# Patient Record
Sex: MALE
[De-identification: names, ages, dates, MRNs, and addresses within clinical notes are randomized per-mention and may not be internally consistent; named-entity substitution may affect disease eponyms.]

---

## 2019-11-21 ENCOUNTER — HOSPITAL ENCOUNTER (INPATIENT)
Dept: HOSPITAL 5 - ED | Age: 74
LOS: 15 days | Discharge: NURSING, REHAB OR LONG-TERM CARE | DRG: 38 | End: 2019-12-06
Attending: HOSPITALIST | Admitting: INTERNAL MEDICINE
Payer: MEDICARE

## 2019-11-21 DIAGNOSIS — I11.0: ICD-10-CM

## 2019-11-21 DIAGNOSIS — J44.9: ICD-10-CM

## 2019-11-21 DIAGNOSIS — Z88.0: ICD-10-CM

## 2019-11-21 DIAGNOSIS — Z95.1: ICD-10-CM

## 2019-11-21 DIAGNOSIS — R29.709: ICD-10-CM

## 2019-11-21 DIAGNOSIS — G47.30: ICD-10-CM

## 2019-11-21 DIAGNOSIS — Z79.899: ICD-10-CM

## 2019-11-21 DIAGNOSIS — Z72.89: ICD-10-CM

## 2019-11-21 DIAGNOSIS — F17.200: ICD-10-CM

## 2019-11-21 DIAGNOSIS — I50.22: ICD-10-CM

## 2019-11-21 DIAGNOSIS — I25.10: ICD-10-CM

## 2019-11-21 DIAGNOSIS — Z87.01: ICD-10-CM

## 2019-11-21 DIAGNOSIS — R29.810: ICD-10-CM

## 2019-11-21 DIAGNOSIS — G81.94: ICD-10-CM

## 2019-11-21 DIAGNOSIS — R47.1: ICD-10-CM

## 2019-11-21 DIAGNOSIS — R47.01: ICD-10-CM

## 2019-11-21 DIAGNOSIS — I65.22: ICD-10-CM

## 2019-11-21 DIAGNOSIS — Z82.49: ICD-10-CM

## 2019-11-21 DIAGNOSIS — R00.1: ICD-10-CM

## 2019-11-21 DIAGNOSIS — I63.9: Primary | ICD-10-CM

## 2019-11-21 DIAGNOSIS — E11.40: ICD-10-CM

## 2019-11-21 LAB
ALBUMIN SERPL-MCNC: 4.5 G/DL (ref 3.9–5)
ALT SERPL-CCNC: 5 UNITS/L (ref 7–56)
APTT BLD: 47.9 SEC. (ref 24.2–36.6)
BASOPHILS # (AUTO): 0.1 K/MM3 (ref 0–0.1)
BASOPHILS NFR BLD AUTO: 0.7 % (ref 0–1.8)
BILIRUB DIRECT SERPL-MCNC: < 0.2 MG/DL (ref 0–0.2)
BUN SERPL-MCNC: 14 MG/DL (ref 9–20)
BUN/CREAT SERPL: 18 %
CALCIUM SERPL-MCNC: 9.8 MG/DL (ref 8.4–10.2)
EOSINOPHIL # BLD AUTO: 0.2 K/MM3 (ref 0–0.4)
EOSINOPHIL NFR BLD AUTO: 3 % (ref 0–4.3)
HCT VFR BLD CALC: 50.3 % (ref 35.5–45.6)
HEMOLYSIS INDEX: 33
HGB BLD-MCNC: 16.1 GM/DL (ref 11.8–15.2)
INR PPP: 1.54 (ref 0.87–1.13)
LYMPHOCYTES # BLD AUTO: 2.1 K/MM3 (ref 1.2–5.4)
LYMPHOCYTES NFR BLD AUTO: 27.9 % (ref 13.4–35)
MCHC RBC AUTO-ENTMCNC: 32 % (ref 32–34)
MCV RBC AUTO: 98 FL (ref 84–94)
MONOCYTES # (AUTO): 0.8 K/MM3 (ref 0–0.8)
MONOCYTES % (AUTO): 10.1 % (ref 0–7.3)
PLATELET # BLD: 172 K/MM3 (ref 140–440)
RBC # BLD AUTO: 5.15 M/MM3 (ref 3.65–5.03)

## 2019-11-21 PROCEDURE — 90686 IIV4 VACC NO PRSV 0.5 ML IM: CPT

## 2019-11-21 PROCEDURE — 70496 CT ANGIOGRAPHY HEAD: CPT

## 2019-11-21 PROCEDURE — 70551 MRI BRAIN STEM W/O DYE: CPT

## 2019-11-21 PROCEDURE — 94760 N-INVAS EAR/PLS OXIMETRY 1: CPT

## 2019-11-21 PROCEDURE — 80048 BASIC METABOLIC PNL TOTAL CA: CPT

## 2019-11-21 PROCEDURE — 86901 BLOOD TYPING SEROLOGIC RH(D): CPT

## 2019-11-21 PROCEDURE — C9113 INJ PANTOPRAZOLE SODIUM, VIA: HCPCS

## 2019-11-21 PROCEDURE — 85025 COMPLETE CBC W/AUTO DIFF WBC: CPT

## 2019-11-21 PROCEDURE — 85610 PROTHROMBIN TIME: CPT

## 2019-11-21 PROCEDURE — 85347 COAGULATION TIME ACTIVATED: CPT

## 2019-11-21 PROCEDURE — C1768 GRAFT, VASCULAR: HCPCS

## 2019-11-21 PROCEDURE — A4649 SURGICAL SUPPLIES: HCPCS

## 2019-11-21 PROCEDURE — 80076 HEPATIC FUNCTION PANEL: CPT

## 2019-11-21 PROCEDURE — 3E03317 INTRODUCTION OF OTHER THROMBOLYTIC INTO PERIPHERAL VEIN, PERCUTANEOUS APPROACH: ICD-10-PCS | Performed by: INTERNAL MEDICINE

## 2019-11-21 PROCEDURE — 93306 TTE W/DOPPLER COMPLETE: CPT

## 2019-11-21 PROCEDURE — 36415 COLL VENOUS BLD VENIPUNCTURE: CPT

## 2019-11-21 PROCEDURE — 93005 ELECTROCARDIOGRAM TRACING: CPT

## 2019-11-21 PROCEDURE — 96375 TX/PRO/DX INJ NEW DRUG ADDON: CPT

## 2019-11-21 PROCEDURE — 70498 CT ANGIOGRAPHY NECK: CPT

## 2019-11-21 PROCEDURE — 84484 ASSAY OF TROPONIN QUANT: CPT

## 2019-11-21 PROCEDURE — 85018 HEMOGLOBIN: CPT

## 2019-11-21 PROCEDURE — 85730 THROMBOPLASTIN TIME PARTIAL: CPT

## 2019-11-21 PROCEDURE — 86900 BLOOD TYPING SEROLOGIC ABO: CPT

## 2019-11-21 PROCEDURE — 80053 COMPREHEN METABOLIC PANEL: CPT

## 2019-11-21 PROCEDURE — 84443 ASSAY THYROID STIM HORMONE: CPT

## 2019-11-21 PROCEDURE — 88304 TISSUE EXAM BY PATHOLOGIST: CPT

## 2019-11-21 PROCEDURE — 85670 THROMBIN TIME PLASMA: CPT

## 2019-11-21 PROCEDURE — 85014 HEMATOCRIT: CPT

## 2019-11-21 PROCEDURE — 82962 GLUCOSE BLOOD TEST: CPT

## 2019-11-21 PROCEDURE — 80061 LIPID PANEL: CPT

## 2019-11-21 PROCEDURE — 88311 DECALCIFY TISSUE: CPT

## 2019-11-21 PROCEDURE — 86850 RBC ANTIBODY SCREEN: CPT

## 2019-11-21 PROCEDURE — 70450 CT HEAD/BRAIN W/O DYE: CPT

## 2019-11-21 PROCEDURE — 93010 ELECTROCARDIOGRAM REPORT: CPT

## 2019-11-21 PROCEDURE — 36620 INSERTION CATHETER ARTERY: CPT

## 2019-11-21 PROCEDURE — 96374 THER/PROPH/DIAG INJ IV PUSH: CPT

## 2019-11-21 NOTE — CAT SCAN REPORT
CT head/brain wo con



INDICATION / CLINICAL INFORMATION:

74 years Male; MAIN: neuro deficits <6hrs or sx present upon awakening- CIODE STROKE ALERT PLEASE REILLY
L [phone]- LAST NORM @ 1700 WITH APHASIA AND RT SIDE FACIAL DROOP.



TECHNIQUE: Routine CT head without contrast. All CT scans at this location are performed using CT dos
e reduction for ALARA by means of automated exposure control.



COMPARISON: 

None.



FINDINGS:



BRAIN / INTRACRANIAL CONTENTS: Subtle loss of gray/white differentiation is suggested in the left par
ietal temporal region and perhaps along the posterior aspect of the insular cortex. Small lacunar inf
arcts suggested in the thalamic regions, as well as the head of the left caudate. Diffusion imaging b
y MRI may be helpful for further evaluation.



Small subdural hygromas suggested bilaterally.



 Otherwise, no acute hemorrhage, mass effect, midline shift, hydrocephalus, or acute, large territori
al infarct.



Moderate cerebral and cerebellar atrophy. Old, branch SCA infarct is seen on the right.



There are moderate areas of decreased attenuation in the white matter of the cerebral hemispheres. Th
jammie are nonspecific findings and may be related to microangiopathy (hypertension, diabetes, atheroscl
erosis), given the patient's age. It might be difficult to evaluate for small areas of ischemia witho
ut diffusion imaging by MRI.



CRANIOCERVICAL JUNCTION: No significant abnormality.



ORBITS: No significant abnormality of visualized orbits.

SINUSES / MASTOIDS: No significant abnormality the visualized paranasal sinuses or mastoid air cells.




ADDITIONAL FINDINGS: Atherosclerotic disease is seen in the anterior circulation. 



IMPRESSION:

1. Early areas of ischemia in the left parietal temporal region suggested, as described above. No def
initive signs of hemorrhagic transformation. Small lacunar infarcts are seen in the gangliocapsular/t
halamic regions as well. Diffusion imaging by MRI would be helpful for further evaluation.

2. Otherwise, no focal mass, hemorrhage, hydrocephalus, or large territorial infarct seen.



This exam was performed as part of a code stroke protocol. The exam was completed on 11/21/2019 524 P
M. The exam was reviewed at 5:40 PM and Dr. Gomez was notified at 5:42 PM.



Signer Name: Glenn Elliott MD, III 

Signed: 11/21/2019 6:43 PM

 Workstation Name: [id number]

## 2019-11-21 NOTE — CAT SCAN REPORT
CT angio neck



INDICATION / CLINICAL INFORMATION:

74 years Male; MAIN: Code stroke from earlier- 100ml Omni 350.



TECHNIQUE: Thin cut axial images obtained through the head during IV bolus contrast administration. S
agittal, coronal, and 3 plane MIP reconstructions performed by the technologist. NASCET type criteria
 used evaluate stenoses. All CT scans at this location are performed using CT dose reduction for ALAR
A by means of automated exposure control.



COMPARISON:

None available.



FINDINGS: 



ARCH: Normal aortic arch branching suggested. Atherosclerotic disease scattered throughout.



CAROTID ARTERIES: The right common and internal carotid arteries are widely patent. There is atherosc
lerotic disease seen at the carotid bifurcation region on the right.



There is high-grade narrowing at the origin of the left internal carotid artery in the region of the 
bulb-near occlusion suggested. Carotid Doppler analysis may be of benefit. The left common carotid is
 widely patent.



VERTEBRAL ARTERIES: Codominant vertebral system seen. There is significant narrowing at the origin of
 the vertebral arteries, related to atherosclerotic disease.



ADDITIONAL FINDINGS: 2, and possibly 3, lesions are seen in the right parotid gland-pleomorphic adeno
mas or Warthin's tumors might be considerations.



Old inferior orbital wall fracture seen on the left. 



IMPRESSION: 

1. High-grade narrowing seen at the origin of the left internal carotid artery. Correlation with romeo
tid Doppler analysis may be of benefit.

2. Hemodynamically significant narrowing seen at the origin of the vertebral arteries.

3. Parotid lesions as described above.

4. Patient reportedly has prior CTs under a different name- Elpidio Hood - from 5/10/2019. However, I a
m unable to review these exams at this time. Once these exams are made available, an addendum can be 
performed for comparative purposes, if needed.



Signer Name: Glenn Elliott MD, III 

Signed: 11/21/2019 10:15 PM

 Workstation Name: [id number]

## 2019-11-21 NOTE — EMERGENCY DEPARTMENT REPORT
HPI





- General


Chief Complaint: Neuro Symptoms/Deficit


Time Seen by Provider: 11/21/19 18:11





- HPI


HPI: 


74-year-old  male presents to the emergency department via EMS from 

home as a code stroke.  The patient has a history of a left-sided MCA stroke 

back in May that left him with some right-sided residual deficits such as 

weakness.  Apparently the patient was last at his baseline, or his last known 

well time, was around 4:30 PM this afternoon when he was seen by his daughter, 

who we have heard from over the phone.  She went out to the store and when she 

came back 20 minutes later he was having difficulty speaking and some increased 

right-sided weakness.  Patient is currently completely aphasic and response to 

questions asked by nodding and shaking his head but does appear to understand 

the questions.  He did not receive anything for his symptoms in route with EMS. 

He is not on any anticoagulation.  No history of GI bleeds.





ED Past Medical Hx





- Past Medical History


Hx Congestive Heart Failure: Yes


Hx Diabetes: Yes


Additional medical history: neuropathy





- Surgical History


Additional Surgical History: CABG x4





- Social History


Smoking Status: Current Every Day Smoker





ED Review of Systems


ROS: 


Stated complaint: POSS CVA


Other details as noted in HPI





Comment: All other systems reviewed and negative


Constitutional: weakness.  denies: fever


Eyes: denies: eye pain, vision change


ENT: denies: ear pain, throat pain


Respiratory: denies: cough, shortness of breath


Cardiovascular: denies: chest pain, palpitations


Gastrointestinal: denies: abdominal pain, vomiting


Genitourinary: denies: dysuria, discharge


Musculoskeletal: denies: back pain, arthralgia


Skin: denies: rash, lesions


Neurological: weakness, other (aphasia)





Physical Exam





- Physical Exam


Vital Signs: 


                                   Vital Signs











  11/21/19 11/21/19 11/21/19





  18:40 18:52 18:59


 


Temperature 98.3 F  


 


Pulse Rate 78 74 69


 


Respiratory 18  





Rate   


 


Blood Pressure 169/80  159/61


 


Blood Pressure  168/88 





[Left]   


 


O2 Sat by Pulse 99  





Oximetry   














  11/21/19





  19:05


 


Temperature 


 


Pulse Rate 71


 


Respiratory 





Rate 


 


Blood Pressure 168/88


 


Blood Pressure 





[Left] 


 


O2 Sat by Pulse 





Oximetry 











Physical Exam: 





GENERAL: The patient is well-developed well-nourished.


HENT: Normocephalic.  Atraumatic.    Patient has moist mucous membranes.


EYES: Extraocular motions are intact.  Pupils equal reactive to light 

bilaterally.


NECK: Supple. Trachea is midline.


CHEST/LUNGS: Clear to auscultation.  There is no respiratory distress noted.


HEART/CARDIOVASCULAR: Regular.  There is no tachycardia.  There is no murmur.


ABDOMEN: Abdomen is soft, nontender.  Patient has normal bowel sounds.  There is

 no abdominal distention.


SKIN: Skin is warm and dry.


NEURO: The patient is awake, alert, and oriented.  The patient is cooperative.  

Right-sided nasolabial fold paresis.  Aphasia and dysarthria.  There is a right 

upper extremity drift but it does not hit the bed.


MUSCULOSKELETAL: There is no tenderness or deformity.   There is no evidence of 

acute injury.





ED Course


                                   Vital Signs











  11/21/19 11/21/19 11/21/19





  18:40 18:52 18:59


 


Temperature 98.3 F  


 


Pulse Rate 78 74 69


 


Respiratory 18  





Rate   


 


Blood Pressure 169/80  159/61


 


Blood Pressure  168/88 





[Left]   


 


O2 Sat by Pulse 99  





Oximetry   














  11/21/19





  19:05


 


Temperature 


 


Pulse Rate 71


 


Respiratory 





Rate 


 


Blood Pressure 168/88


 


Blood Pressure 





[Left] 


 


O2 Sat by Pulse 





Oximetry 














ED Medical Decision Making





- Lab Data


Result diagrams: 


                                 11/21/19 19:40





                                 11/21/19 19:40





- EKG Data


-: EKG Interpreted by Me


EKG shows normal: sinus rhythm (ventricular trigeminy), axis (left axis 

deviation), intervals, QRS complexes (left bundle-branch block), ST-T waves


Rate: normal





- EKG Data


When compared to previous EKG there are: previous EKG unavailable


Interpretation: other (sinus rhythm with ventricular trigeminy, left axis 

deviation, left bundle branch block)





- Radiology Data


Radiology results: report reviewed





CT head/brain wo con INDICATION / CLINICAL INFORMATION: 74 years Male; MAIN: 

neuro deficits <6hrs or sx present upon awakening- CIODE STROKE ALERT PLEASE 

CALL [phone]- LAST NORM @ 1700 WITH APHASIA AND RT SIDE FACIAL DROOP. 

TECHNIQUE: Routine CT head without contrast. All CT scans at this location are 

performed using CT dose reduction for ALARA by means of automated exposure 

control. COMPARISON: None. FINDINGS: BRAIN / INTRACRANIAL CONTENTS: Subtle loss 

of gray/white differentiation is suggested in the left parietal temporal region 

and perhaps along the posterior aspect of the insular cortex. Small lacunar inf

arcts suggested in the thalamic regions, as well as the head of the left 

caudate. Diffusion imaging by MRI may be helpful for further evaluation. Small 

subdural hygromas suggested bilaterally. Otherwise, no acute hemorrhage, mass 

effect, midline shift, hydrocephalus, or acute, large territorial infarct. 

Moderate cerebral and cerebellar atrophy. Old, branch SCA infarct is seen on the

 right. There are moderate areas of decreased attenuation in the white matter of

 the cerebral hemispheres. These are nonspecific findings and may be related to 

microangiopathy (hypertension, diabetes, atherosclerosis), given the patient's 

age. It might be difficult to evaluate for small areas of ischemia without d

iffusion imaging by MRI. CRANIOCERVICAL JUNCTION: No significant abnormality. 

ORBITS: No significant abnormality of visualized orbits. SINUSES / MASTOIDS: No 

significant abnormality the visualized paranasal sinuses or mastoid air cells. 

ADDITIONAL FINDINGS: Atherosclerotic disease is seen in the anterior 

circulation. IMPRESSION: 1. Early areas of ischemia in the left parietal 

temporal region suggested, as described above. No definitive signs of 

hemorrhagic transformation. Small lacunar infarcts are seen in the ga 

ngliocapsular/thalamic regions as well. Diffusion imaging by MRI would be 

helpful for further evaluation. 2. Otherwise, no focal mass, hemorrhage, 

hydrocephalus, or large territorial infarct seen. 








CT angio head INDICATION / CLINICAL INFORMATION: 74 years Male; Stroke. 

TECHNIQUE: Thin cut axial images obtained through the head during IV bolus co

ntrast administration. Sagittal, coronal, and 3 plane MIP reconstructions 

performed by the technologist. NASCET type criteria used evaluate stenoses. 

Automated exposure control utilized for radiation reduction purposes. Motion 

artifact COMPARISON: None available. FINDINGS: INTERNAL CAROTID ARTERIES: 

Atherosclerotic disease is seen in the cavernous portion of both internal 

carotid arteries. Some of these areas of narrowing may be hemodynamically 

significant. VERTEBROBASILAR SYSTEM: No significant narrowing appreciated. 

DISTAL BRANCHES: Distal branches of the anterior, middle, and posterior cerebral

 arteries are fairly symmetric in appearance and number. Areas of mild narrowing

 cannot entirely be excluded. Certainly, no branch occlusion is appreciated. 

Duplicated M1 segment suggested on the left. ANEURYSM: None identified. 

ADDITIONAL FINDINGS: Developmental venous anomaly suggested in the inferior 

frontal gyral region on the left-of no clinical significance. IMPRESSION: Areas 

of narrowing, as described above. 








CT angio neck INDICATION / CLINICAL INFORMATION: 74 years Male; MAIN: Code 

stroke from earlier- 100ml Omni 350. TECHNIQUE: Thin cut axial images obtained 

through the head during IV bolus contrast administration. Sagittal, coronal, and

 3 plane MIP reconstructions performed by the technologist. NASCET type criteria

 used evaluate stenoses. All CT scans at this location are performed using CT 

dose reduction for ALARA by means of automated exposure control. COMPARISON: 

None available. FINDINGS: ARCH: Normal aortic arch branching suggested. A

therosclerotic disease scattered throughout. CAROTID ARTERIES: The right common 

and internal carotid arteries are widely patent. There is atherosclerotic 

disease seen at the carotid bifurcation region on the right. There is high-grade

 narrowing at the origin of the left internal carotid artery in the region of 

the bulb-near occlusion suggested. Carotid Doppler analysis may be of benefit. 

The left common carotid is widely patent. VERTEBRAL ARTERIES: Codominant 

vertebral system seen. There is significant narrowing at the origin of the 

vertebral arteries, related to atherosclerotic disease. ADDITIONAL FINDINGS: 2, 

and possibly 3, lesions are seen in the right parotid gland-pleomorphic adenomas

 or Warthin's tumors might be considerations. Old inferior orbital wall fracture

 seen on the left. IMPRESSION: 1. High-grade narrowing seen at the origin of the

 left internal carotid artery. Correlation with carotid Doppler analysis may be 

of benefit. 2. Hemodynamically significant narrowing seen at the origin of the 

vertebral arteries. 3. Parotid lesions as described above. 4. Patient reportedly

 has prior CTs under a different name- Elpidio Hood - from 5/10/2019. However, I 

am unable to review these exams at this time. Once these exams are made 

available, an addendum can be performed for comparative purposes, if needed. 





- Medical Decision Making


This patient presents with increased right-sided weakness, dysarthria, aphasia 

and worsened right-sided facial droop.  The patient was in the TPA window.  He 

was seen by neurology both before and after he had the CT scan of the head 

completed.  CT head did not show any bleed, shift, mass, ischemia or any other 

acute process.  Patient was found to have a NIH stroke scale of 9.  There was a 

long discussion regarding the risks versus benefits of TPA and there was a 

unanimous choice of administering TPA.  After TPA was given the patient had CT 

angiography of the head and neck completed.  There was no obvious large vessel 

occlusion but the patient does appear to have significant left carotid stenosis.

  His labs have been mostly unremarkable.  Patient will be admitted to the 

hospital for further evaluation, probable neurology consult, possible vascular 

consult and further treatment.  He was accepted for admission by the 

hospitalist, Dr. Chu.








- Differential Diagnosis


CVA, TIA, Hypoglycemia, Dysrythmia


Critical Care Time: Yes


Critical care time in (mins) excluding proc time.: 35


Critical care attestation.: 


If time is entered above; I have spent that time in minutes in the direct care 

of this critically ill patient, excluding procedure time.  Critical care time 

was spent on this patient and doing his initial evaluation, multiple re-

evaluations, discussion with the neurologist, discussion with the family, 

ordering and interpretation of labs and imaging, a long discussion regarding TPA





Critical Care Time: 





35 minutes





ED Disposition


Clinical Impression: 


Stroke


Qualifiers:


 CVA mechanism: unspecified Qualified Code(s): I63.9 - Cerebral infarction, 

unspecified





Carotid stenosis


Qualifiers:


 Laterality: left Qualified Code(s): I65.22 - Occlusion and stenosis of left 

carotid artery





Hypertension


Qualifiers:


 Hypertension type: essential hypertension Qualified Code(s): I10 - Essential 

(primary) hypertension





Disposition: DC-09 OP ADMIT IP TO THIS HOSP


Is pt being admited?: Yes


Condition: Serious


Time of Disposition: 23:36

## 2019-11-21 NOTE — CAT SCAN REPORT
CT angio head



INDICATION / CLINICAL INFORMATION:

74 years Male; Stroke.



TECHNIQUE: Thin cut axial images obtained through the head during IV bolus contrast administration. S
agittal, coronal, and 3 plane MIP reconstructions performed by the technologist. NASCET type criteria
 used evaluate stenoses. Automated exposure control utilized for radiation reduction purposes. Motion
 artifact



COMPARISON: 

None available.



FINDINGS:



INTERNAL CAROTID ARTERIES: Atherosclerotic disease is seen in the cavernous portion of both internal 
carotid arteries. Some of these areas of narrowing may be hemodynamically significant.



VERTEBROBASILAR SYSTEM: No significant narrowing appreciated.



DISTAL BRANCHES: Distal branches of the anterior, middle, and posterior cerebral arteries are fairly 
symmetric in appearance and number. Areas of mild narrowing cannot entirely be excluded. Certainly, n
o branch occlusion is appreciated. Duplicated M1 segment suggested on the left.



ANEURYSM: None identified.



ADDITIONAL FINDINGS: Developmental venous anomaly suggested in the inferior frontal gyral region on t
he left-of no clinical significance. 



IMPRESSION:

Areas of narrowing, as described above.



Signer Name: Glenn Elliott MD, III 

Signed: 11/21/2019 9:23 PM

 Workstation Name: [id number]

## 2019-11-21 NOTE — EMERGENCY DEPARTMENT REPORT
ED Neuro Deficit HPI





- General


Chief Complaint: Neuro Symptoms/Deficit


Stated Complaint: POSS CVA


Time Seen by Provider: 11/21/19 18:11


Source: EMS


Mode of arrival: Stretcher


Limitations: Altered Mental Status





- History of Present Illness


Initial Comments: 





[facility]


[facility]








Date of Service:   11/21/2019 17:50:39





Impression:


      RO Acute Ischemic Stroke





Comments:


74 year old male with left MCA distribution stroke. Stroke is likely secondary 

to his history of left ICA stenosis.





Mechanism of Stroke:


Possible Thromboembolic


Possible Cardioembolic





Metrics:


Last Known Well: 11/21/2019 16:30:00


[facility] Notification Time: 11/21/2019 17:49:38


Arrival Time: 11/21/2019 18:03:00


Stamp Time: 11/21/2019 17:50:39


Time First Login Attempt: 11/21/2019 17:55:00


Video Start Time: 11/21/2019 17:55:00





Symptoms: Aphasia


NIHSS Start Assessment Time: 11/21/2019 18:15:00


tPA Verbal Order Time: 11/21/2019 18:43:44


Patient is a candidate for tPA.


tPA CPOE Order Time: 11/21/2019 18:51:43


Needle Time: 11/21/2019 19:00:02


Weight Noted by Staff: 98.4 kg


Video End Time: 11/21/2019 19:07:53





CT head was reviewed.





Advanced imaging is to be reviewed by ED physician and RAD[facility].


Advanced imaging CTA head and neck obtained.








Radiologist was not called back for review of advanced imaging because CTA 

pending


ER Physician notified of the decision on thrombolytics management on 11/21/2019 

19:00:17





Verbal Consent to tPA:


I have explained to the Patient and Family the nature of the patients 

condition, the use of tPA fibrinolytic agent, and the benefits to be reasonably 

expected compared with alternative approaches. I have discussed the likelihood 

of major risks or complications of this procedure including (if applicable) but 

not limited to loss of limb function, brain damage, paralysis, hemorrhage, 

infection, complications from transfusion of blood components, drug reactions, 

blood clots and loss of life. I have also indicated that with any procedure 

there is always the possibility of an unexpected complication.


All questions were answered and Patient and Family express understanding of the 

treatment plan and consent to the treatment.





Our recommendations are outlined below.





Recommendations:


IV tPA recommended.





tPA bolus given Without Complication.





IV tPA Total Dose  88.6 mg


IV tPA Bolus Dose  8.9 mg


IV tPA Infusion Dose - 79.7 mg








Routine post tPA monitoring including neuro checks and blood pressure control 

during/after treatment Monitor blood pressure Check blood pressure and NIHSS 

every 15 min for 2 h, then every 30 min for 6 h, and finally every hour for 16 

h.





Manage Blood Pressure per post tPA protocol.





      Admission to ICU


      CT brain 24 hours post tPA


      NPO until swallowing screen performed and passed


      No antiplatelet agents or anticoagulants (including heparin for DVT 

prophylaxis) in first 24 hours


      No Black catheter, nasogastric tube, arterial catheter or central venous 

catheter for 24 hr, unless absolutely necessary


      Telemetry


      Bedside swallow evaluation


      HOB less than 30 degrees


      Euglycemia


      Avoid hyperthermia, PRN acetaminophen


      DVT prophylaxis


      Inpatient Neurology Consultation


      Stroke evaluation as per inpatient neurology recommendations





Additional Recommendations:


      MRI Head Without Contrast


      CTA Head and Neck


      Start Atorvastatin


      Lipid Panel


      Check Hgb A1c


      Dysphagia Screen


      DVT Prophylaxis


      Hyperglycemia Treatment as per Primary Team


      PT/ OT / Speech Therapy Consultation


      Neurology to Be Consulted for Inpatient Routine Consultation


      Echocardiogram, TTE





Discussed with ED physician











------------------------------------------------------------------------------





History of Present Illness:


Patient is a 74 years old Male.





Patient was brought by EMS for symptoms of Aphasia





74 year old male with a history of left MCA stroke in May with residual right 

side weakness who presents to the ED with acute onset of difficulty speaking. 

Patient was last seen normal at 16:30 by his daughter before she left for [employer]. She then returned home 20 minutes later and found that the patient had 

worsening of his right side weakness and was unable to speak. On exam patient 

had drift in the right arm and was mute. He was able to nod yes/no appropriately

but could not say anything.





CT head was reviewed.





Last seen normal was within 4.5 hours.


There is no history of hemorrhagic complications or intracranial hemorrhage.


There is no history of Recent Anticoagulants.


There is no history of recent major surgery.


There is no history of recent stroke.





Examination:


BP(168/88),


1A: Level of Consciousness - Alert; keenly responsive + 0


1B: Ask Month and Age - Aphasic + 2


1C: Blink Eyes & Squeeze Hands - Performs Both Tasks + 0


2: Test Horizontal Extraocular Movements - Normal + 0


3: Test Visual Fields - No Visual Loss + 0


4: Test Facial Palsy (Use Grimace if Obtunded) - Partial paralysis (lower face) 

+ 2


5A: Test Left Arm Motor Drift - No Drift for 10 Seconds + 0


5B: Test Right Arm Motor Drift - Drift, but doesn't hit bed + 1


6A: Test Left Leg Motor Drift - No Drift for 5 Seconds + 0


6B: Test Right Leg Motor Drift - No Drift for 5 Seconds + 0


7: Test Limb Ataxia (FNF/Heel-Shin) - No Ataxia + 0


8: Test Sensation - Normal; No sensory loss + 0


9: Test Language/Aphasia - Severe Aphasia: Fragmentary Expression, Inference 

Needed, Cannot Identify Materials + 2


10: Test Dysarthria - Mute/Anarthric + 2


11: Test Extinction/Inattention - No abnormality + 0





NIHSS Score: 9





Patient was informed the Neurology Consult would happen via TeleHealth consult 

by way of interactive audio and video telecommunications and consented to 

receiving care in this manner.





Due to the immediate potential for life-threatening deterioration due to 

underlying acute neurologic illness, I spent 35 minutes providing critical care.

This time includes time for face to face visit via telemedicine, review of 

medical records, imaging studies and discussion of findings with providers, the 

patient and/or family.








Dr Megha Rodriguez








[facility]


[phone]





- Related Data


Allergies/Adverse Reactions: 


                                    Allergies











Allergy/AdvReac Type Severity Reaction Status Date / Time


 


Unable to Assess Allergy   Unverified 11/21/19 18:06














ED Review of Systems


ROS: 


Stated complaint: POSS CVA


Other details as noted in HPI








ED Past Medical Hx





- Past Medical History


Hx Congestive Heart Failure: Yes


Hx Diabetes: Yes


Additional medical history: neuropathy





- Surgical History


Additional Surgical History: CABG x4





- Social History


Smoking Status: Current Every Day Smoker





ED Neuro Physical Exam





- General


Limitations: Altered Mental Status


General appearance: alert


Suspected Stroke: Yes





- NIHSS


Assessment Interval: Baseline


1a. Level of Consciousness: alert/keenly responsive


1b. LOC Questions: aphasic


1c. LOC Commands: performs tasks correctly


2. Best Gaze: normal


3. Visual: no visual loss


4. Facial Palsy: partial paralysis


5b. Motor Arm Right: drift


5a. Motor Arm Left: no drift


6a. Motor Leg Left: no drift


6b. Motor Leg Right: no drift


7. Limb Ataxia: absent


8. Sensory: normal


9. Best Language: severe aphasia


10. Dysarthria: severe dysarthria


11. Extinction/Inattention: no abnormality


Total Score: 9


Stroke Severity: Moderate Stroke





ED Course


                                   Vital Signs











  11/21/19 11/21/19 11/21/19





  18:40 18:52 18:59


 


Temperature 98.3 F  


 


Pulse Rate 78 74 69


 


Respiratory 18  





Rate   


 


Blood Pressure 169/80  159/61


 


Blood Pressure  168/88 





[Left]   


 


O2 Sat by Pulse 99  





Oximetry   














  11/21/19





  19:05


 


Temperature 


 


Pulse Rate 71


 


Respiratory 





Rate 


 


Blood Pressure 168/88


 


Blood Pressure 





[Left] 


 


O2 Sat by Pulse 





Oximetry 











Critical care attestation.: 


If time is entered above; I have spent that time in minutes in the direct care 

of this critically ill patient, excluding procedure time.








ED Disposition


Clinical Impression: 


 Stroke





Disposition: [id number] OP ADMIT IP TO THIS HOSP


Is pt being admited?: Yes


Does the pt Need Aspirin: No


Condition: Stable

## 2019-11-21 NOTE — HISTORY AND PHYSICAL REPORT
History of Present Illness


Date of examination: 11/21/19


History of present illness: 


74 -year-old  a history of CHF, coronary artery disease, diabetes, 

carotid stenosis, COPD, CVA was brought to the emergency room for evaluation.  

History is per to daughter stated that she wanted to store and while she was at 

the store the mom called her to see that the father was not able to speak that 

the left side of his face was droopy.  She went back home, called EMS and 

brought the patient to the emergency room.  TPA was given in the emergency room,

he is now able to say a few words.  Patient has a history of carotid stenosis, 

refused surgery in the past


Review of systems


Constitutional: no  weight loss, chills, fever


Ears, eyes, nose, mouth and throat: no nasal congestion, no nasal discharge, no 

sinus pressure, no vision change, no red eye.


Neck: No neck pain or rigidity.


Cardiovascular: no chest pain, palpitations


Respiratory: no cough, shortness of breath


Gastrointestinal: no abdominal pain hematochezia


Genitourinary : no  frequency , no hematuria


Musculoskeletal: no joint swelling or muscle ache 


Integumentary: no rash, no pruritis


Neurological: no parathesias, no numbness, no focal weakness


Endocrine: no cold or heat intolerance, no polyuria or polydipsia


Hematologic/Lymphatic: no easy bruising, no easy bleeding, no gland swelling


Allergic/Immunologic: no urticaria, no angioedema.





PAST MEDICAL HISTORY: CHF, coronary artery disease, carotid stenosis, diabetes, 

COPD, CVA





PAST SURGICAL HISTORY: CABG





SOCIAL HISTORY: Social alcohol, no drugs, smoke 1 pack a day





FAMILY HISTORY: Hypertension














Medications and Allergies


                                    Allergies











Allergy/AdvReac Type Severity Reaction Status Date / Time


 


Unable to Assess Allergy   Unverified 11/21/19 18:06














Exam





- Physical Exam


Narrative exam: 


Gen. appearance: Patient lying in bed, no apparent distress


HEENT: Normocephalic, atraumatic, pupils equally round and reactive to light,  

extraocular movement intact, and no sclericterus,. No JVD or thyromegaly or 

nodule,neck supple, no carotid bruit ,mucous membranes moist, no exudate or 

erythema


Heart: S1, S2, regular rate and rhythm


Lungs: Clear bilaterally, breathing comfortable


Abdomen: Positive bowel sounds, non-tender, nondistended, no organomegaly


Extremity:no edema cyanosis, clubbing


Skin:  no rash, dry, warm


Neuro: Oriented 3, cranial nerves II-12 intact, speak very few words, dysarthri

a, motor and sensory intact








- Constitutional


Vitals: 


                                        











Temp Pulse Resp BP Pulse Ox


 


 98.3 F   65   11 L  153/77   97 


 


 11/21/19 18:40  11/21/19 20:31  11/21/19 20:31  11/21/19 20:31  11/21/19 20:31














Results





- Labs


CBC & Chem 7: 


                                 11/21/19 19:40





                                 11/21/19 19:40


Labs: 


                              Abnormal lab results











  11/21/19 11/21/19 11/21/19 Range/Units





  19:40 19:40 19:40 


 


RBC  5.15 H    (3.65-5.03)  M/mm3


 


Hgb  16.1 H    (11.8-15.2)  gm/dl


 


Hct  50.3 H    (35.5-45.6)  %


 


MCV  98 H    (84-94)  fl


 


Mono % (Auto)  10.1 H    (0.0-7.3)  %


 


PT   18.3 H   (12.2-14.9)  Sec.


 


INR   1.54 H   (0.87-1.13)  


 


APTT   47.9 H   (24.2-36.6)  Sec.


 


Thrombin Time     (15.1-19.6)  Sec.


 


Sodium    136 L  (137-145)  mmol/L


 


Carbon Dioxide    19 L  (22-30)  mmol/L


 


Glucose    134 H  ()  mg/dL


 


ALT     (7-56)  units/L














  11/21/19 11/21/19 Range/Units





  19:40 19:40 


 


RBC    (3.65-5.03)  M/mm3


 


Hgb    (11.8-15.2)  gm/dl


 


Hct    (35.5-45.6)  %


 


MCV    (84-94)  fl


 


Mono % (Auto)    (0.0-7.3)  %


 


PT    (12.2-14.9)  Sec.


 


INR    (0.87-1.13)  


 


APTT    (24.2-36.6)  Sec.


 


Thrombin Time  43.9 H   (15.1-19.6)  Sec.


 


Sodium    (137-145)  mmol/L


 


Carbon Dioxide    (22-30)  mmol/L


 


Glucose    ()  mg/dL


 


ALT   5 L  (7-56)  units/L














- Imaging and Cardiology


CT Scan - head: report reviewed





Assessment and Plan


CTA head and neck reviewed





Assessment


Acute CVA, status post TPA


Carotid stenosis


CHF, stable


coronary artery disease


diabetes


COPD





Plan


Admit medicine


Obtain MRI of the head, echo


consult neurology, PT and OT, critical care


No antiplatelet agents, status post TPA, add statin


check fingersticks, initiate insulin sliding scale


DVT prophalaxis





Addendum


Patient with hematemesis, 2 episodes


Start Protonix drip , check serial hemoglobin


Consult GI

## 2019-11-22 LAB
HCT VFR BLD CALC: 41.8 % (ref 35.5–45.6)
HCT VFR BLD CALC: 43.2 % (ref 35.5–45.6)
HCT VFR BLD CALC: 43.2 % (ref 35.5–45.6)
HCT VFR BLD CALC: 43.6 % (ref 35.5–45.6)
HDLC SERPL-MCNC: 34 MG/DL (ref 40–59)
HGB BLD-MCNC: 14 GM/DL (ref 11.8–15.2)
HGB BLD-MCNC: 14.4 GM/DL (ref 11.8–15.2)
HGB BLD-MCNC: 14.8 GM/DL (ref 11.8–15.2)
HGB BLD-MCNC: 14.8 GM/DL (ref 11.8–15.2)

## 2019-11-22 NOTE — CONSULTATION
History of Present Illness





- Reason for Consult


Consult date: 11/22/19


acute CVA and carotid stenosis 





- History of Present Illness





HPI: 73yo male with multiple medical problems with hx of prior CVA in the left 

MCA distribution with associated right-sided weakness presents with episode of 

difficulty speaking and left-sided facial droop last night.  The patient was 

given TPA and Neck CTA demonstrated severe stenosis of the left carotid.  The 

patient currently only able to speak with 1-2 word answers.  The patient denies 

extremity weakness.  The patient has smoked 1 ppd for several years.  Per notes 

patient previously offered carotid endarterectomy, but refused intervention.  

Patient also with 2 episodes of emesis in the ED and started on protonix drip.





ROS: as per HPI but limited 





PMH: CVA, CHF, Diabetes





PE:


NAD, A&Ox3


non-labored respirations


RRR


abd soft, nt, nd, no palpable masses


CNs II-XII intact


right upper extremity strength 4/5, right lower extremity 5/5, left-sided ext

remity motor intact


sensory intact


feet warm





Neck CTA reviewed





Plan: Patient with symptomatic severe left-sided carotid stenosis 


patient to benefit from left CEA for stroke risk reduction, explained in detail 

to the patient and wants to proceed 


will plan for left CEA Monday


continue medical management with ASA, statin and may consider plavix given 

repeat stroke but will defer to Neurology 


regarding dark brown emesis, patient may benefit from GI consult 








Medications and Allergies


                                    Allergies











Allergy/AdvReac Type Severity Reaction Status Date / Time


 


Unable to Assess Allergy   Unverified 11/21/19 18:06











Active Meds: 


Active Medications





Acetaminophen (Tylenol)  650 mg PO Q4H PRN


   PRN Reason: Pain, Mild (1-3)


Atorvastatin Calcium (Lipitor)  80 mg PO QHS OCTAVIANO


Bisacodyl (Dulcolax)  10 mg VA QDAY PRN


   PRN Reason: Constipation


Hydralazine HCl (Apresoline)  5 mg IV Q6H PRN


   PRN Reason: Hypertension


Pantoprazole Sodium 80 mg/ (Sodium Chloride)  100 mls @ 10 mls/hr IV AS DIRECT 

OCTAVIANO


   Last Admin: 11/22/19 02:33 Dose:  8 mg/hr, 10 mls/hr


   Documented by: 


Magnesium Hydroxide (Milk Of Magnesia)  30 ml PO Q4H PRN


   PRN Reason: Constipation


Ondansetron HCl (Zofran)  4 mg IV Q8H PRN


   PRN Reason: Nausea And Vomiting


   Last Admin: 11/22/19 01:25 Dose:  4 mg


   Documented by: 


Sodium Chloride (Sodium Chloride Flush Syringe 10 Ml)  10 ml IV PRN PRN


   PRN Reason: LINE FLUSH











Exam





- Constitutional


Vitals: 


                                        











Temp Pulse Resp BP Pulse Ox


 


 98.2 F   91 H  15   129/57   93 


 


 11/22/19 00:30  11/22/19 10:00  11/22/19 10:00  11/22/19 10:00  11/22/19 10:00














Results





- Labs


CBC & Chem 7: 


                                 11/22/19 05:59





                                 11/21/19 19:40


Labs: 


                              Abnormal lab results











  11/21/19 11/21/19 11/21/19 Range/Units





  19:40 19:40 19:40 


 


RBC  5.15 H    (3.65-5.03)  M/mm3


 


Hgb  16.1 H    (11.8-15.2)  gm/dl


 


Hct  50.3 H    (35.5-45.6)  %


 


MCV  98 H    (84-94)  fl


 


Mono % (Auto)  10.1 H    (0.0-7.3)  %


 


PT   18.3 H   (12.2-14.9)  Sec.


 


INR   1.54 H   (0.87-1.13)  


 


APTT   47.9 H   (24.2-36.6)  Sec.


 


Thrombin Time     (15.1-19.6)  Sec.


 


Sodium    136 L  (137-145)  mmol/L


 


Carbon Dioxide    19 L  (22-30)  mmol/L


 


Glucose    134 H  ()  mg/dL


 


ALT     (7-56)  units/L


 


HDL Cholesterol     (40-59)  mg/dL














  11/21/19 11/21/19 11/22/19 Range/Units





  19:40 19:40 03:59 


 


RBC     (3.65-5.03)  M/mm3


 


Hgb     (11.8-15.2)  gm/dl


 


Hct     (35.5-45.6)  %


 


MCV     (84-94)  fl


 


Mono % (Auto)     (0.0-7.3)  %


 


PT     (12.2-14.9)  Sec.


 


INR     (0.87-1.13)  


 


APTT     (24.2-36.6)  Sec.


 


Thrombin Time  43.9 H    (15.1-19.6)  Sec.


 


Sodium     (137-145)  mmol/L


 


Carbon Dioxide     (22-30)  mmol/L


 


Glucose     ()  mg/dL


 


ALT   5 L   (7-56)  units/L


 


HDL Cholesterol    34 L  (40-59)  mg/dL

## 2019-11-22 NOTE — PROGRESS NOTE
Assessment and Plan


Assessment and plan: 


Patient is a 75 yo  man with a history of CHF, coronary artery disease,

diabetes, carotid stenosis, COPD and prior CVA  who presented with aphasia and 

facial droop. He received tpa and now able to mumble a couple of words. Patient 

has a history of carotid stenosis, refused surgery in the past per chart. 





* CTA head and neck reviewed





Assessment


Acute CVA, status post TPA


Carotid stenosis: Vascular surgeon consulted, input noted


CHF, stable


coronary artery disease


diabetes


COPD


Patient with emesis, 2 episodes, ?hematemesis, GI evaluated, input noted





Plan


Admit medicine


Obtain MRI of the head, echo


consult neurology, PT and OT, critical care


No antiplatelet agents, status post TPA, add statin


check fingersticks, initiate insulin sliding scale


DVT prophalaxis








History


Interval history: 


Patient was seen and examined. Follow-up on current diagnosis of CVA s/p tpa. No

overnight events reported to me.  Imaging, nursing note, chart, labs and old ch

art reviewed. 





Hospitalist Physical





- Physical exam


Narrative exam: 


Gen: chronically disable appearing, NAD, Awake, Alert, Orientated 


HEENT: NCAT, EOMI, PERRL, OP Clear 


Neck: supple, no adenopathy, no thyromegaly, no JVD 


CVS/Heart: RRR, normal S1S2, pulses present bilaterally 


Chest/Lungs: CTA B, Symmetrical chest expansion, good air entry bilaterally 


GI/Abdomen: soft, NTND, good bowel sounds, no guarding or rebound 


/Bladder: no suprapubic tenderness, no CVA or paraspinal tenderness 


Extermity/Skin: no c/c/e, no obvious rash 


MSK: FROM x 4 


Neuro: CN 2-12 grossly intact, expressive aphasia


Psych: calm 





- Constitutional


Vitals: 


                                        











Temp Pulse Resp BP Pulse Ox


 


 98.2 F   77   18   151/58   99 


 


 11/22/19 00:30  11/22/19 17:00  11/22/19 17:00  11/22/19 17:00  11/22/19 17:00














Results





- Labs


CBC & Chem 7: 


                                 11/22/19 13:13





                                 11/21/19 19:40


Labs: 


                             Laboratory Last Values











WBC  7.5 K/mm3 (4.5-11.0)   11/21/19  19:40    


 


RBC  5.15 M/mm3 (3.65-5.03)  H  11/21/19  19:40    


 


Hgb  14.0 gm/dl (11.8-15.2)   11/22/19  13:13    


 


Hct  41.8 % (35.5-45.6)   11/22/19  13:13    


 


MCV  98 fl (84-94)  H  11/21/19  19:40    


 


MCH  31 pg (28-32)   11/21/19  19:40    


 


MCHC  32 % (32-34)   11/21/19  19:40    


 


RDW  15.0 % (13.2-15.2)   11/21/19  19:40    


 


Plt Count  172 K/mm3 (140-440)   11/21/19  19:40    


 


Lymph % (Auto)  27.9 % (13.4-35.0)   11/21/19  19:40    


 


Mono % (Auto)  10.1 % (0.0-7.3)  H  11/21/19  19:40    


 


Eos % (Auto)  3.0 % (0.0-4.3)   11/21/19  19:40    


 


Baso % (Auto)  0.7 % (0.0-1.8)   11/21/19  19:40    


 


Lymph #  2.1 K/mm3 (1.2-5.4)   11/21/19  19:40    


 


Mono #  0.8 K/mm3 (0.0-0.8)   11/21/19  19:40    


 


Eos #  0.2 K/mm3 (0.0-0.4)   11/21/19  19:40    


 


Baso #  0.1 K/mm3 (0.0-0.1)   11/21/19  19:40    


 


Seg Neutrophils %  58.3 % (40.0-70.0)   11/21/19  19:40    


 


Seg Neutrophils #  4.4 K/mm3 (1.8-7.7)   11/21/19  19:40    


 


PT  18.3 Sec. (12.2-14.9)  H  11/21/19  19:40    


 


INR  1.54  (0.87-1.13)  H  11/21/19  19:40    


 


APTT  47.9 Sec. (24.2-36.6)  H  11/21/19  19:40    


 


Thrombin Time  43.9 Sec. (15.1-19.6)  H  11/21/19  19:40    


 


Sodium  136 mmol/L (137-145)  L  11/21/19  19:40    


 


Potassium  4.4 mmol/L (3.6-5.0)   11/21/19  19:40    


 


Chloride  98.9 mmol/L ()   11/21/19  19:40    


 


Carbon Dioxide  19 mmol/L (22-30)  L  11/21/19  19:40    


 


Anion Gap  23 mmol/L  11/21/19  19:40    


 


BUN  14 mg/dL (9-20)   11/21/19  19:40    


 


Creatinine  0.8 mg/dL (0.8-1.5)   11/21/19  19:40    


 


Estimated GFR  > 60 ml/min  11/21/19  19:40    


 


BUN/Creatinine Ratio  18 %  11/21/19  19:40    


 


Glucose  134 mg/dL ()  H  11/21/19  19:40    


 


Calcium  9.8 mg/dL (8.4-10.2)   11/21/19  19:40    


 


Total Bilirubin  0.30 mg/dL (0.1-1.2)   11/21/19  19:40    


 


Direct Bilirubin  < 0.2 mg/dL (0-0.2)   11/21/19  19:40    


 


AST  11 units/L (5-40)   11/21/19  19:40    


 


ALT  5 units/L (7-56)  L  11/21/19  19:40    


 


Alkaline Phosphatase  106 units/L ()   11/21/19  19:40    


 


Troponin T  < 0.010 ng/mL (0.00-0.029)   11/21/19  19:40    


 


Total Protein  8.2 g/dL (6.3-8.2)   11/21/19  19:40    


 


Albumin  4.5 g/dL (3.9-5)   11/21/19  19:40    


 


Albumin/Globulin Ratio  1.2 %  11/21/19  19:40    


 


Triglycerides  89 mg/dL (2-149)   11/22/19  03:59    


 


Cholesterol  153 mg/dL ()   11/22/19  03:59    


 


LDL Cholesterol Direct  108 mg/dL ()   11/22/19  03:59    


 


HDL Cholesterol  34 mg/dL (40-59)  L  11/22/19  03:59    


 


Cholesterol/HDL Ratio  4.50 %  11/22/19  03:59    


 


TSH  0.943 mlU/mL (0.270-4.200)   11/21/19  19:40    


 


Blood Type  O POSITIVE   11/21/19  19:40    


 


Antibody Screen  Negative   11/21/19  19:40    














Active Medications





- Current Medications


Current Medications: 














Generic Name Dose Route Start Last Admin





  Trade Name Freq  PRN Reason Stop Dose Admin


 


Acetaminophen  650 mg  11/21/19 22:07 





  Tylenol  PO  





  Q4H PRN  





  Pain, Mild (1-3)  


 


Atorvastatin Calcium  80 mg  11/22/19 22:00 





  Lipitor  PO  





  QHS OCTAVIANO  


 


Bisacodyl  10 mg  11/21/19 22:07 





  Dulcolax  MA  





  QDAY PRN  





  Constipation  


 


Hydralazine HCl  5 mg  11/22/19 02:21 





  Apresoline  IV  





  Q6H PRN  





  Hypertension  


 


Pantoprazole Sodium 80 mg/  100 mls @ 10 mls/hr  11/22/19 03:00  11/22/19 02:33





  Sodium Chloride  IV   8 mg/hr





  AS DIRECT OCTAVIANO   10 mls/hr





    Administration





  8 MG/HR  


 


Magnesium Hydroxide  30 ml  11/21/19 22:07 





  Milk Of Magnesia  PO  





  Q4H PRN  





  Constipation  


 


Ondansetron HCl  4 mg  11/21/19 22:07  11/22/19 01:25





  Zofran  IV   4 mg





  Q8H PRN   Administration





  Nausea And Vomiting  


 


Sodium Chloride  10 ml  11/21/19 22:07 





  Sodium Chloride Flush Syringe 10 Ml  IV  





  PRN PRN  





  LINE FLUSH

## 2019-11-22 NOTE — GASTROENTEROLOGY CONSULTATION
History of Present Illness





- Reason for Consult


Consult date: 11/22/19


Emesis


Requesting physician: JOHAN SALAS





- History of Present Illness


The patient is a 75 yo male who presented with acute ischemic stroke s/p TPA in 

the ED.  Pt unable to provide history at time of exam; history gathered from 

chart review and pt's nurse.  He reportedly had dark brown emesis overnight.  No

gross/overt bleeding, melena, or hematochezia.  H/H initially decreased, but 

remains in normal range and stable on repeat checks.








Past History


Past Medical History: diabetes, heart failure, stroke


Past Surgical History: CABG


Social history: smoking


Family history: hypertension





Medications and Allergies


                                    Allergies











Allergy/AdvReac Type Severity Reaction Status Date / Time


 


Unable to Assess Allergy   Unverified 11/21/19 18:06











Active Meds: 


Active Medications





Acetaminophen (Tylenol)  650 mg PO Q4H PRN


   PRN Reason: Pain, Mild (1-3)


Atorvastatin Calcium (Lipitor)  80 mg PO QHS OCTAVIANO


Bisacodyl (Dulcolax)  10 mg [place] QDAY PRN


   PRN Reason: Constipation


Hydralazine HCl (Apresoline)  5 mg IV Q6H PRN


   PRN Reason: Hypertension


Pantoprazole Sodium 80 mg/ (Sodium Chloride)  100 mls @ 10 mls/hr IV AS DIRECT 

OCTAVIANO


   Last Admin: 11/22/19 02:33 Dose:  8 mg/hr, 10 mls/hr


   Documented by: 


Magnesium Hydroxide (Milk Of Magnesia)  30 ml PO Q4H PRN


   PRN Reason: Constipation


Ondansetron HCl (Zofran)  4 mg IV Q8H PRN


   PRN Reason: Nausea And Vomiting


   Last Admin: 11/22/19 01:25 Dose:  4 mg


   Documented by: 


Sodium Chloride (Sodium Chloride Flush Syringe 10 Ml)  10 ml IV PRN PRN


   PRN Reason: LINE FLUSH





Reviewed/updated patient's home and current medications





Review of Systems





- Review of Systems


ROS unobtainable: due to mental status





Exam





- Constitutional


Vital Signs: 


                                        











Temp Pulse Resp BP Pulse Ox


 


 98.2 F   75   15   104/39   97 


 


 11/22/19 00:30  11/22/19 13:00  11/22/19 13:00  11/22/19 13:00  11/22/19 13:00











General appearance: no acute distress, other (unable to obtain history from pt)





- EENT


Eyes: PERRL





- Neck


Neck: supple





- Respiratory


Respiratory effort: normal


Respiratory: bilateral: CTA





- Cardiovascular


Rhythm: regular


Heart Sounds: Present: S1 & S2


Extremities: No edema





- Gastrointestinal


General gastrointestinal: Present: soft, non-tender, non-distended





- Neurologic


Neurological: disoriented





- Labs


CBC & Chem 7: 


                                 11/22/19 13:13





                                 11/21/19 19:40


Lab Results: 


                         Laboratory Results - last 24 hr











  11/21/19 11/21/19 11/21/19





  19:40 19:40 19:40


 


WBC  7.5  


 


RBC  5.15 H  


 


Hgb  16.1 H  


 


Hct  50.3 H  


 


MCV  98 H  


 


MCH  31  


 


MCHC  32  


 


RDW  15.0  


 


Plt Count  172  


 


Lymph % (Auto)  27.9  


 


Mono % (Auto)  10.1 H  


 


Eos % (Auto)  3.0  


 


Baso % (Auto)  0.7  


 


Lymph #  2.1  


 


Mono #  0.8  


 


Eos #  0.2  


 


Baso #  0.1  


 


Seg Neutrophils %  58.3  


 


Seg Neutrophils #  4.4  


 


PT   18.3 H 


 


INR   1.54 H 


 


APTT   47.9 H 


 


Thrombin Time   


 


Sodium    136 L


 


Potassium    4.4


 


Chloride    98.9


 


Carbon Dioxide    19 L


 


Anion Gap    23


 


BUN    14


 


Creatinine    0.8


 


Estimated GFR    > 60


 


BUN/Creatinine Ratio    18


 


Glucose    134 H


 


Calcium    9.8


 


Total Bilirubin   


 


Direct Bilirubin   


 


AST   


 


ALT   


 


Alkaline Phosphatase   


 


Troponin T    < 0.010


 


Total Protein   


 


Albumin   


 


Albumin/Globulin Ratio   


 


Triglycerides   


 


Cholesterol   


 


LDL Cholesterol Direct   


 


HDL Cholesterol   


 


Cholesterol/HDL Ratio   


 


TSH   


 


Blood Type   


 


Antibody Screen   














  11/21/19 11/21/19 11/21/19





  19:40 19:40 19:40


 


WBC   


 


RBC   


 


Hgb   


 


Hct   


 


MCV   


 


MCH   


 


MCHC   


 


RDW   


 


Plt Count   


 


Lymph % (Auto)   


 


Mono % (Auto)   


 


Eos % (Auto)   


 


Baso % (Auto)   


 


Lymph #   


 


Mono #   


 


Eos #   


 


Baso #   


 


Seg Neutrophils %   


 


Seg Neutrophils #   


 


PT   


 


INR   


 


APTT   


 


Thrombin Time  43.9 H  


 


Sodium   


 


Potassium   


 


Chloride   


 


Carbon Dioxide   


 


Anion Gap   


 


BUN   


 


Creatinine   


 


Estimated GFR   


 


BUN/Creatinine Ratio   


 


Glucose   


 


Calcium   


 


Total Bilirubin   


 


Direct Bilirubin   


 


AST   


 


ALT   


 


Alkaline Phosphatase   


 


Troponin T   


 


Total Protein   


 


Albumin   


 


Albumin/Globulin Ratio   


 


Triglycerides   


 


Cholesterol   


 


LDL Cholesterol Direct   


 


HDL Cholesterol   


 


Cholesterol/HDL Ratio   


 


TSH   0.943 


 


Blood Type    O POSITIVE


 


Antibody Screen    Negative














  11/21/19 11/22/19 11/22/19





  19:40 03:59 03:59


 


WBC   


 


RBC   


 


Hgb    14.8


 


Hct    43.2  D


 


MCV   


 


MCH   


 


MCHC   


 


RDW   


 


Plt Count   


 


Lymph % (Auto)   


 


Mono % (Auto)   


 


Eos % (Auto)   


 


Baso % (Auto)   


 


Lymph #   


 


Mono #   


 


Eos #   


 


Baso #   


 


Seg Neutrophils %   


 


Seg Neutrophils #   


 


PT   


 


INR   


 


APTT   


 


Thrombin Time   


 


Sodium   


 


Potassium   


 


Chloride   


 


Carbon Dioxide   


 


Anion Gap   


 


BUN   


 


Creatinine   


 


Estimated GFR   


 


BUN/Creatinine Ratio   


 


Glucose   


 


Calcium   


 


Total Bilirubin  0.30  


 


Direct Bilirubin  < 0.2  


 


AST  11  


 


ALT  5 L  


 


Alkaline Phosphatase  106  


 


Troponin T   


 


Total Protein  8.2  


 


Albumin  4.5  


 


Albumin/Globulin Ratio  1.2  


 


Triglycerides   89 


 


Cholesterol   153 


 


LDL Cholesterol Direct   108 


 


HDL Cholesterol   34 L 


 


Cholesterol/HDL Ratio   4.50 


 


TSH   


 


Blood Type   


 


Antibody Screen   














  11/22/19 11/22/19





  05:59 13:13


 


WBC  


 


RBC  


 


Hgb  14.8  14.0


 


Hct  43.6  41.8


 


MCV  


 


MCH  


 


MCHC  


 


RDW  


 


Plt Count  


 


Lymph % (Auto)  


 


Mono % (Auto)  


 


Eos % (Auto)  


 


Baso % (Auto)  


 


Lymph #  


 


Mono #  


 


Eos #  


 


Baso #  


 


Seg Neutrophils %  


 


Seg Neutrophils #  


 


PT  


 


INR  


 


APTT  


 


Thrombin Time  


 


Sodium  


 


Potassium  


 


Chloride  


 


Carbon Dioxide  


 


Anion Gap  


 


BUN  


 


Creatinine  


 


Estimated GFR  


 


BUN/Creatinine Ratio  


 


Glucose  


 


Calcium  


 


Total Bilirubin  


 


Direct Bilirubin  


 


AST  


 


ALT  


 


Alkaline Phosphatase  


 


Troponin T  


 


Total Protein  


 


Albumin  


 


Albumin/Globulin Ratio  


 


Triglycerides  


 


Cholesterol  


 


LDL Cholesterol Direct  


 


HDL Cholesterol  


 


Cholesterol/HDL Ratio  


 


TSH  


 


Blood Type  


 


Antibody Screen  














Assessment and Plan


1. Emesis - no gross bleeding with emesis per report from nurse; H/H stable.  

cont IV PPI for time being and monitor labs.  will reserve endoscopy for signs 

of active bleeding or worsening H/H.  


2. Acute CVA - s/p TPA


-will follow

## 2019-11-22 NOTE — CONSULTATION
History of Present Illness


Consult date: 11/22/19


Chief complaint: 





aphasia and weakness


History of present illness: 





This is a 75 YO M who presented to the ED with the above stated complaints.  

Found to be a candidate for Alteplase and it was given.  Pt is getting some 

speech back and is moving the right side better.  Denied taking aspirin at home.





Past History


Past Medical History: diabetes, heart failure, stroke


Past Surgical History: CABG


Social history: smoking


Family history: hypertension





Medications and Allergies


                                    Allergies











Allergy/AdvReac Type Severity Reaction Status Date / Time


 


Unable to Assess Allergy   Unverified 11/21/19 18:06











Active Meds: 


Active Medications





Acetaminophen (Tylenol)  650 mg PO Q4H PRN


   PRN Reason: Pain, Mild (1-3)


Atorvastatin Calcium (Lipitor)  80 mg PO QHS OCTAVIANO


Bisacodyl (Dulcolax)  10 mg [place] QDAY PRN


   PRN Reason: Constipation


Hydralazine HCl (Apresoline)  5 mg IV Q6H PRN


   PRN Reason: Hypertension


Pantoprazole Sodium 80 mg/ (Sodium Chloride)  100 mls @ 10 mls/hr IV AS DIRECT 

OCTAVIANO


   Last Admin: 11/22/19 02:33 Dose:  8 mg/hr, 10 mls/hr


   Documented by: 


Magnesium Hydroxide (Milk Of Magnesia)  30 ml PO Q4H PRN


   PRN Reason: Constipation


Ondansetron HCl (Zofran)  4 mg IV Q8H PRN


   PRN Reason: Nausea And Vomiting


   Last Admin: 11/22/19 01:25 Dose:  4 mg


   Documented by: 


Sodium Chloride (Sodium Chloride Flush Syringe 10 Ml)  10 ml IV PRN PRN


   PRN Reason: LINE FLUSH











Review of Systems


ROS unobtainable: due to endotracheal tube, due to mental status (due to 

aphasia)





Physical Examination





- Vital Signs


Vital Signs: 


                                   Vital Signs











Pulse Resp Pulse Ox


 


 79   17   99 


 


 11/21/19 18:24  11/21/19 18:24  11/21/19 18:24














- Constitutional


General appearance: comfortable





- EENT


EENT: Present: PERRL, mucous membranes moist





- Respiratory


Respiratory: Present: lungs clear





- Cardiovascular


Cardiovascular: Present: regular rate


Extremities: Present: no peripheral edema bilatateraly





- Gastrointestinal


Gastrointestinal: Present: normoactive bowel sounds





- Neurologic


Cranial nerve examination: PERRL, EOMI, V1/V2/V3 grossly intact, tongue midline,

facial droop


Sensorimotor examination: intact


Motor examination - right side: 4/5: biceps, triceps, wrist flexion, wrist 

extension, , hip flexors, knee extensors, dorsiflexion, toe extension (EHL),

plantarflexion


Motor examination - left side: 5/5: biceps, triceps, wrist flexion, wrist 

extension, , hip flexors, knee extensors, dorsiflexion, toe extension (EHL),

plantarflexion


Reflexes: 0: ankle, bicep, knee, tricep





- Psychiatric


Psychiatric: Present: mood/affect appropriate





Results





- Laboratory Findings


CBC and BMP: 


                                 11/22/19 13:13





                                 11/21/19 19:40


Abnormal Lab Findings: 


                                  Abnormal Labs











  11/21/19 11/21/19 11/21/19





  19:40 19:40 19:40


 


RBC  5.15 H  


 


Hgb  16.1 H  


 


Hct  50.3 H  


 


MCV  98 H  


 


Mono % (Auto)  10.1 H  


 


PT   18.3 H 


 


INR   1.54 H 


 


APTT   47.9 H 


 


Thrombin Time   


 


Sodium    136 L


 


Carbon Dioxide    19 L


 


Glucose    134 H


 


ALT   


 


HDL Cholesterol   














  11/21/19 11/21/19 11/22/19





  19:40 19:40 03:59


 


RBC   


 


Hgb   


 


Hct   


 


MCV   


 


Mono % (Auto)   


 


PT   


 


INR   


 


APTT   


 


Thrombin Time  43.9 H  


 


Sodium   


 


Carbon Dioxide   


 


Glucose   


 


ALT   5 L 


 


HDL Cholesterol    34 L














- Diagnostic Findings


Additional findings: 





CTA neck with carotid stenosis on the left





Assessment and Plan





This is a 75 YO M with presumed stroke, L MCA and Left carotid stenosis


Recommend:


Post Alteplase protocol


Ordered STAT head CT 24 ours post infusion to rule out hemorrhage, if negative 

start antiplatelet therapy


MRI Brain if no contraindication, CTA reviewed, echo, lipids and A1C, VTE 

prophylaxis


PT/OT/ST


BP as per protocol


Continue care for all medical issues as you are doing

## 2019-11-22 NOTE — CAT SCAN REPORT
Examination: CT of the head without contrast



Clinical information: History of stroke symptoms. The patient has received TPA treatment.



Comparison: CT of the head without contrast, 11/21/2019



Technical: Multiple axial CT images of the head were obtained without intravenous contrast.  Sagittal
 and coronal reformats were obtained.  All CTs at this facility utilize dose reduction techniques inc
luding automated exposure control, iterative reconstruction and weight based dosing when appropriate 
to reduce patient radiation dose to as low as reasonable achievable.



Findings: Again noted on today's evaluation is subtle loss of gray/white differentiation within the l
eft parietal temporal region, appearing similar to the recent previous study. Changes associated with
 chronic small vessel ischemic disease are again noted with small lacunar infarcts within the bilater
al thalamic region. There is no CT evidence of acute intracranial hemorrhage.



Evaluation of the calvarium demonstrates no acute abnormality. The visualized paranasal sinuses are c
lear.



Impression: 

1.  Stable appearance of subtle loss of gray-white matter differentiation in the left parietotemporal
 region not significantly changed when compared to the previous study.

2. Stable findings associated with chronic small vessel ischemic disease.



Signer Name: Thania Perez MD 

Signed: 11/22/2019 11:26 PM

 Workstation Name: [id number]

## 2019-11-23 RX ADMIN — GABAPENTIN SCH MG: 300 CAPSULE ORAL at 23:48

## 2019-11-23 NOTE — CONSULTATION
History of Present Illness





- Reason for Consult


Consult date: 11/23/19


CVA, s/p TPA


Requesting physician: JOHAN SALAS





- History of Present Illness





73 y/o male, came in 2 days ago with weakness.  Evaluated in the ED and given 

TPA.  24 hours post TPA was yesterday at 1800.  Neurology has seen last night.  

Remainder is negative.  





Past History


Past Medical History: diabetes, heart failure, stroke


Past Surgical History: CABG


Social history: smoking


Family history: hypertension





Medications and Allergies


                                    Allergies











Allergy/AdvReac Type Severity Reaction Status Date / Time


 


Unable to Assess Allergy   Unverified 11/21/19 18:06











                                Home Medications











 Medication  Instructions  Recorded  Confirmed  Last Taken  Type


 


Gabapentin 300 mg PO TID 11/22/19 11/22/19 Unknown History


 


Losartan Potassium 25 mg PO DAILY 11/22/19 11/22/19 Unknown History


 


Pantoprazole 40 mg PO DAILY 11/22/19 11/22/19 Unknown History


 


metFORMIN 500 mg PO BID 11/22/19 11/22/19 Unknown History











Active Meds: 


Active Medications





Acetaminophen (Tylenol)  650 mg PO Q4H PRN


   PRN Reason: Pain, Mild (1-3)


Atorvastatin Calcium (Lipitor)  80 mg PO QHS OCTAVIANO


   Last Admin: 11/22/19 21:44 Dose:  80 mg


   Documented by: 


Bisacodyl (Dulcolax)  10 mg [place] QDAY PRN


   PRN Reason: Constipation


Hydralazine HCl (Apresoline)  5 mg IV Q6H PRN


   PRN Reason: Hypertension


   Last Admin: 11/23/19 00:12 Dose:  5 mg


   Documented by: 


Pantoprazole Sodium 80 mg/ (Sodium Chloride)  100 mls @ 10 mls/hr IV AS DIRECT 

OCTAVIAON


   Last Admin: 11/22/19 02:33 Dose:  8 mg/hr, 10 mls/hr


   Documented by: 


Magnesium Hydroxide (Milk Of Magnesia)  30 ml PO Q4H PRN


   PRN Reason: Constipation


Ondansetron HCl (Zofran)  4 mg IV Q8H PRN


   PRN Reason: Nausea And Vomiting


   Last Admin: 11/22/19 01:25 Dose:  4 mg


   Documented by: 


Sodium Chloride (Sodium Chloride Flush Syringe 10 Ml)  10 ml IV PRN PRN


   PRN Reason: LINE FLUSH











Review of Systems


All systems: negative





Exam





- Constitutional


Vitals: 


                                        











Temp Pulse Resp BP Pulse Ox


 


 97.8 F   75   14   150/62   93 


 


 11/23/19 08:00  11/23/19 09:11  11/23/19 09:11  11/23/19 09:11  11/23/19 09:11














Results





- Labs


CBC & Chem 7: 


                                 11/22/19 20:33





                                 11/21/19 19:40


Labs: 


                              Abnormal lab results











  11/22/19 11/22/19 11/22/19 Range/Units





  18:21 21:30 23:28 


 


POC Glucose  118 H  149 H  137 H  ()  














  11/23/19 Range/Units





  05:40 


 


POC Glucose  111 H  ()  














Assessment and Plan





73 y/o male s/p TPA for possible stroke





1. Stroke protocol


2.  24 hour observation in ICU is complete


3.  BP control per primary


4.  Feed patient


5.  Stable for transfer.

## 2019-11-23 NOTE — GASTROENTEROLOGY PROGRESS NOTE
Assessment and Plan


GI: presented w/ ischemic stroke with reported coffee emesis


- no signs bleeding overnight


-0 h/h stable, will follow


- continue PPI protocol


- ok to start po from GI standpoint


- no plans to scope at this time, will follow








Subjective


Date of service: 11/23/19


Interval history: 


- no signs bleeding overnight. No GI complaints








Objective





- Constitutional


Vitals: 


                                        











Temp Pulse Resp BP Pulse Ox


 


 97.8 F   75   14   150/62   93 


 


 11/23/19 08:00  11/23/19 09:11  11/23/19 09:11  11/23/19 09:11  11/23/19 09:11











General appearance: no acute distress





- EENT


Eyes: PERRL





- Respiratory


Respiratory: bilateral: CTA





- Cardiovascular


Rhythm: regular


Heart Sounds: Present: S1 & S2





- Gastrointestinal


General gastrointestinal: Present: soft, non-tender, non-distended





- Labs


CBC & Chem 7: 


                                 11/22/19 20:33





                                 11/21/19 19:40


Labs: 


                         Laboratory Results - last 24 hr











  11/22/19 11/22/19 11/22/19





  13:13 18:21 20:33


 


Hgb  14.0   14.4


 


Hct  41.8   43.2


 


POC Glucose   118 H 














  11/22/19 11/22/19 11/23/19





  21:30 23:28 05:40


 


Hgb   


 


Hct   


 


POC Glucose  149 H  137 H  111 H

## 2019-11-23 NOTE — PROGRESS NOTE
Assessment and Plan





- Patient Problems


(1) Carotid stenosis


Current Visit: Yes   Status: Acute   


Qualifiers: 


   Laterality: left   Qualified Code(s): I65.22 - Occlusion and stenosis of left

carotid artery   


Plan to address problem: 


Currently pending vascular surgery consult.  Vascular surgeon consult.  Mean 

surgical correction.








(2) Hypertension


Current Visit: Yes   Status: Acute   


Qualifiers: 


   Hypertension type: essential hypertension   Qualified Code(s): I10 - 

Essential (primary) hypertension   


Plan to address problem: 


Patient currently has optimal control of hypertension continue present 

management.








(3) Stroke


Current Visit: Yes   Status: Acute   


Qualifiers: 


   CVA mechanism: unspecified   Qualified Code(s): I63.9 - Cerebral infarction, 

unspecified   


Plan to address problem: 


Chest with acute CVA.  Continue aggressive antiplatelet and antilipid therapy.  

Patient seemed to have done well with TPA.  Symptoms are resolved and now 

neurologic assistance.








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


Current Visit: Yes   Status: Acute   





History


Interval history: 





74-year-old with a history of CHF with facial weakness and left-sided muscle 

weakness and neuropathy received TPA in the ED and transferred to unit for 

observation.  Patient has been doing well and observation has gone from mumbling

to speak in full sentences without difficulty.  Patient now is moving all 

extremities and has no numbness at present.  Patient stable for discharge to 4. 

And continue present observation.





Hospitalist Physical





- Constitutional


Vitals: 


                                        











Temp Pulse Resp BP Pulse Ox


 


 97.1 F L  71   17   96/49   98 


 


 11/23/19 15:27  11/23/19 12:01  11/23/19 12:01  11/23/19 12:01  11/23/19 12:01











General appearance: Present: no acute distress





- EENT


Eyes: Present: PERRL, EOM intact, irregular pupil


ENT: hearing intact, clear oral mucosa, dentition normal





- Neck


Neck: Present: supple, normal ROM





- Respiratory


Respiratory: bilateral: CTA





- Cardiovascular


Rhythm: regular


Heart Sounds: Present: S1 & S2





- Extremities


Extremities: no ischemia, pulses intact, pulses symmetrical, No edema, normal 

temperature, normal color


Extremity abnormal: edema


Peripheral Pulses: within normal limits





- Abdominal


General gastrointestinal: soft, non-tender, non-distended, normal bowel sounds





- Integumentary


Integumentary: Present: clear, warm, dry





- Psychiatric


Psychiatric: appropriate mood/affect, intact judgment & insight, memory intact





- Neurologic


Neurologic: CNII-XII intact, focal deficits, moves all extremities





Results





- Labs


CBC & Chem 7: 


                                 11/22/19 20:33





                                 11/21/19 19:40


Labs: 


                             Laboratory Last Values











WBC  7.5 K/mm3 (4.5-11.0)   11/21/19  19:40    


 


RBC  5.15 M/mm3 (3.65-5.03)  H  11/21/19  19:40    


 


Hgb  14.4 gm/dl (11.8-15.2)   11/22/19  20:33    


 


Hct  43.2 % (35.5-45.6)   11/22/19  20:33    


 


MCV  98 fl (84-94)  H  11/21/19  19:40    


 


MCH  31 pg (28-32)   11/21/19  19:40    


 


MCHC  32 % (32-34)   11/21/19  19:40    


 


RDW  15.0 % (13.2-15.2)   11/21/19  19:40    


 


Plt Count  172 K/mm3 (140-440)   11/21/19  19:40    


 


Lymph % (Auto)  27.9 % (13.4-35.0)   11/21/19  19:40    


 


Mono % (Auto)  10.1 % (0.0-7.3)  H  11/21/19  19:40    


 


Eos % (Auto)  3.0 % (0.0-4.3)   11/21/19  19:40    


 


Baso % (Auto)  0.7 % (0.0-1.8)   11/21/19  19:40    


 


Lymph #  2.1 K/mm3 (1.2-5.4)   11/21/19  19:40    


 


Mono #  0.8 K/mm3 (0.0-0.8)   11/21/19  19:40    


 


Eos #  0.2 K/mm3 (0.0-0.4)   11/21/19  19:40    


 


Baso #  0.1 K/mm3 (0.0-0.1)   11/21/19  19:40    


 


Seg Neutrophils %  58.3 % (40.0-70.0)   11/21/19  19:40    


 


Seg Neutrophils #  4.4 K/mm3 (1.8-7.7)   11/21/19  19:40    


 


PT  18.3 Sec. (12.2-14.9)  H  11/21/19  19:40    


 


INR  1.54  (0.87-1.13)  H  11/21/19  19:40    


 


APTT  47.9 Sec. (24.2-36.6)  H  11/21/19  19:40    


 


Thrombin Time  43.9 Sec. (15.1-19.6)  H  11/21/19  19:40    


 


Sodium  136 mmol/L (137-145)  L  11/21/19  19:40    


 


Potassium  4.4 mmol/L (3.6-5.0)   11/21/19  19:40    


 


Chloride  98.9 mmol/L ()   11/21/19  19:40    


 


Carbon Dioxide  19 mmol/L (22-30)  L  11/21/19  19:40    


 


Anion Gap  23 mmol/L  11/21/19  19:40    


 


BUN  14 mg/dL (9-20)   11/21/19  19:40    


 


Creatinine  0.8 mg/dL (0.8-1.5)   11/21/19  19:40    


 


Estimated GFR  > 60 ml/min  11/21/19  19:40    


 


BUN/Creatinine Ratio  18 %  11/21/19  19:40    


 


Glucose  134 mg/dL ()  H  11/21/19  19:40    


 


POC Glucose  151  ()  H  11/23/19  13:34    


 


Calcium  9.8 mg/dL (8.4-10.2)   11/21/19  19:40    


 


Total Bilirubin  0.30 mg/dL (0.1-1.2)   11/21/19  19:40    


 


Direct Bilirubin  < 0.2 mg/dL (0-0.2)   11/21/19  19:40    


 


AST  11 units/L (5-40)   11/21/19  19:40    


 


ALT  5 units/L (7-56)  L  11/21/19  19:40    


 


Alkaline Phosphatase  106 units/L ()   11/21/19  19:40    


 


Troponin T  < 0.010 ng/mL (0.00-0.029)   11/21/19  19:40    


 


Total Protein  8.2 g/dL (6.3-8.2)   11/21/19  19:40    


 


Albumin  4.5 g/dL (3.9-5)   11/21/19  19:40    


 


Albumin/Globulin Ratio  1.2 %  11/21/19  19:40    


 


Triglycerides  89 mg/dL (2-149)   11/22/19  03:59    


 


Cholesterol  153 mg/dL ()   11/22/19  03:59    


 


LDL Cholesterol Direct  108 mg/dL ()   11/22/19  03:59    


 


HDL Cholesterol  34 mg/dL (40-59)  L  11/22/19  03:59    


 


Cholesterol/HDL Ratio  4.50 %  11/22/19  03:59    


 


TSH  0.943 mlU/mL (0.270-4.200)   11/21/19  19:40    


 


Blood Type  O POSITIVE   11/21/19  19:40    


 


Antibody Screen  Negative   11/21/19  19:40    














Active Medications





- Current Medications


Current Medications: 














Generic Name Dose Route Start Last Admin





  Trade Name Freq  PRN Reason Stop Dose Admin


 


Acetaminophen  650 mg  11/21/19 22:07 





  Tylenol  PO  





  Q4H PRN  





  Pain, Mild (1-3)  


 


Atorvastatin Calcium  80 mg  11/22/19 22:00  11/22/19 21:44





  Lipitor  PO   80 mg





  [facility] OCTAVIANO   Administration


 


Bisacodyl  10 mg  11/21/19 22:07 





  Dulcolax  NM  





  QDAY PRN  





  Constipation  


 


Hydralazine HCl  5 mg  11/22/19 02:21  11/23/19 00:12





  Apresoline  IV   5 mg





  Q6H PRN   Administration





  Hypertension  


 


Pantoprazole Sodium 80 mg/  100 mls @ 10 mls/hr  11/22/19 03:00  11/22/19 02:33





  Sodium Chloride  IV   8 mg/hr





  AS DIRECT OCTAVIANO   10 mls/hr





    Administration





  8 MG/HR  


 


Magnesium Hydroxide  30 ml  11/21/19 22:07 





  Milk Of Magnesia  PO  





  Q4H PRN  





  Constipation  


 


Ondansetron HCl  4 mg  11/21/19 22:07  11/22/19 01:25





  Zofran  IV   4 mg





  Q8H PRN   Administration





  Nausea And Vomiting  


 


Sodium Chloride  10 ml  11/21/19 22:07 





  Sodium Chloride Flush Syringe 10 Ml  IV  





  PRN PRN  





  LINE FLUSH

## 2019-11-24 LAB
BASOPHILS # (AUTO): 0 K/MM3 (ref 0–0.1)
BASOPHILS NFR BLD AUTO: 0.3 % (ref 0–1.8)
EOSINOPHIL # BLD AUTO: 0.3 K/MM3 (ref 0–0.4)
EOSINOPHIL NFR BLD AUTO: 3.5 % (ref 0–4.3)
HCT VFR BLD CALC: 44.7 % (ref 35.5–45.6)
HGB BLD-MCNC: 15.3 GM/DL (ref 11.8–15.2)
LYMPHOCYTES # BLD AUTO: 1.9 K/MM3 (ref 1.2–5.4)
LYMPHOCYTES NFR BLD AUTO: 24.6 % (ref 13.4–35)
MCHC RBC AUTO-ENTMCNC: 34 % (ref 32–34)
MCV RBC AUTO: 93 FL (ref 84–94)
MONOCYTES # (AUTO): 0.7 K/MM3 (ref 0–0.8)
MONOCYTES % (AUTO): 8.7 % (ref 0–7.3)
PLATELET # BLD: 179 K/MM3 (ref 140–440)
RBC # BLD AUTO: 4.79 M/MM3 (ref 3.65–5.03)

## 2019-11-24 RX ADMIN — GABAPENTIN SCH: 300 CAPSULE ORAL at 18:49

## 2019-11-24 RX ADMIN — INSULIN HUMAN SCH: 100 INJECTION, SOLUTION PARENTERAL at 23:03

## 2019-11-24 RX ADMIN — GABAPENTIN SCH MG: 300 CAPSULE ORAL at 10:45

## 2019-11-24 RX ADMIN — INSULIN HUMAN SCH: 100 INJECTION, SOLUTION PARENTERAL at 08:00

## 2019-11-24 RX ADMIN — GABAPENTIN SCH MG: 300 CAPSULE ORAL at 21:42

## 2019-11-24 RX ADMIN — INSULIN HUMAN SCH UNITS: 100 INJECTION, SOLUTION PARENTERAL at 18:56

## 2019-11-24 RX ADMIN — INSULIN HUMAN SCH: 100 INJECTION, SOLUTION PARENTERAL at 12:00

## 2019-11-24 NOTE — PROGRESS NOTE
Subjective


Date of service: 11/24/19


Interval history: 





patient with symptomatic severe left sided carotid stenosis


dysarthia improving, rest neurologic exam unchanged from admission


plan for Left CEA tomorrow


NPO p MN 








Objective





- Constitutional


Vitals: 


                               Vital Signs - 12hr











  11/23/19 11/24/19 11/24/19





  23:42 01:34 04:08


 


Temperature 98.0 F  


 


Pulse Rate 75  70


 


Respiratory 8 L 18 





Rate   


 


Blood Pressure 151/59  


 


O2 Sat by Pulse 93  





Oximetry   














  11/24/19 11/24/19 11/24/19





  04:28 08:24 08:36


 


Temperature 98.0 F 98.2 F 


 


Pulse Rate 78 68 


 


Respiratory 18 18 





Rate   


 


Blood Pressure 116/50 113/60 


 


O2 Sat by Pulse 93 94 94





Oximetry   














- Labs


CBC & Chem 7: 


                                 11/24/19 09:05





                                 11/21/19 19:40


Labs: 


                              Abnormal lab results











  11/23/19 11/23/19 11/24/19 Range/Units





  13:34 18:00 09:05 


 


Hgb    15.3 H  (11.8-15.2)  gm/dl


 


Mono % (Auto)    8.7 H  (0.0-7.3)  %


 


POC Glucose  151 H  111 H   ()  














Medications & Allergies





- Medications


Allergies/Adverse Reactions: 


                                    Allergies





Penicillins Allergy (Verified 11/23/19 16:45)


   Rash








Home Medications: 


                                Home Medications











 Medication  Instructions  Recorded  Confirmed  Last Taken  Type


 


Gabapentin 300 mg PO TID 11/22/19 11/22/19 Unknown History


 


Losartan Potassium 25 mg PO DAILY 11/22/19 11/22/19 Unknown History


 


Pantoprazole 40 mg PO DAILY 11/22/19 11/22/19 Unknown History


 


metFORMIN 500 mg PO BID 11/22/19 11/22/19 Unknown History











Active Medications: 














Generic Name Dose Route Start Last Admin





  Trade Name Freq  PRN Reason Stop Dose Admin


 


Acetaminophen  650 mg  11/21/19 22:07  11/24/19 10:45





  Tylenol  PO   650 mg





  Q4H PRN   Administration





  Pain, Mild (1-3)  


 


Atorvastatin Calcium  80 mg  11/22/19 22:00  11/23/19 22:03





  Lipitor  PO   80 mg





  QHS OTCAVIANO   Administration


 


Bisacodyl  10 mg  11/21/19 22:07 





  Dulcolax  NM  





  QDAY PRN  





  Constipation  


 


Gabapentin  300 mg  11/23/19 23:30  11/24/19 10:45





  Gabapentin  PO   300 mg





  TID OCTAVIANO   Administration


 


Hydralazine HCl  5 mg  11/22/19 02:21  11/23/19 00:12





  Apresoline  IV   5 mg





  Q6H PRN   Administration





  Hypertension  


 


Pantoprazole Sodium 80 mg/  100 mls @ 10 mls/hr  11/22/19 03:00  11/22/19 02:33





  Sodium Chloride  IV   8 mg/hr





  AS DIRECT OCTAVIANO   10 mls/hr





    Administration





  8 MG/HR  


 


Insulin Human Regular  0 units  11/24/19 07:30  11/24/19 08:00





  Humulin R  SUB-Q   Not Given





  [facility]  





  Protocol  


 


Magnesium Hydroxide  30 ml  11/21/19 22:07 





  Milk Of Magnesia  PO  





  Q4H PRN  





  Constipation  


 


Ondansetron HCl  4 mg  11/21/19 22:07  11/22/19 01:25





  Zofran  IV   4 mg





  Q8H PRN   Administration





  Nausea And Vomiting  


 


Sodium Chloride  10 ml  11/21/19 22:07 





  Sodium Chloride Flush Syringe 10 Ml  IV  





  PRN PRN  





  LINE FLUSH

## 2019-11-24 NOTE — PROGRESS NOTE
Assessment and Plan





73 y/o male s/p TPA for possible stroke





1. Will sign off, call if questions.  NO acute pulmonary issues present.





Subjective


Date of service: 11/24/19


Interval history: 





Successful transfer out of unit.  No pulmonary issues.





Objective





- Constitutional


Vitals: 


                               Vital Signs - 12hr











  11/23/19 11/24/19 11/24/19





  23:42 01:34 04:08


 


Temperature 98.0 F  


 


Pulse Rate 75  70


 


Respiratory 8 L 18 





Rate   


 


Blood Pressure 151/59  


 


O2 Sat by Pulse 93  





Oximetry   














  11/24/19 11/24/19 11/24/19





  04:28 08:24 08:36


 


Temperature 98.0 F 98.2 F 


 


Pulse Rate 78 68 


 


Respiratory 18 18 





Rate   


 


Blood Pressure 116/50 113/60 


 


O2 Sat by Pulse 93 94 94





Oximetry   














- Labs


CBC & Chem 7: 


                                 11/24/19 09:05





                                 11/21/19 19:40


Labs: 


                              Abnormal lab results











  11/23/19 11/23/19 11/24/19 Range/Units





  13:34 18:00 09:05 


 


Hgb    15.3 H  (11.8-15.2)  gm/dl


 


Mono % (Auto)    8.7 H  (0.0-7.3)  %


 


POC Glucose  151 H  111 H   ()  














Medications & Allergies





- Medications


Allergies/Adverse Reactions: 


                                    Allergies





Penicillins Allergy (Verified 11/23/19 16:45)


   Rash








Home Medications: 


                                Home Medications











 Medication  Instructions  Recorded  Confirmed  Last Taken  Type


 


Gabapentin 300 mg PO TID 11/22/19 11/22/19 Unknown History


 


Losartan Potassium 25 mg PO DAILY 11/22/19 11/22/19 Unknown History


 


Pantoprazole 40 mg PO DAILY 11/22/19 11/22/19 Unknown History


 


metFORMIN 500 mg PO BID 11/22/19 11/22/19 Unknown History











Active Medications: 














Generic Name Dose Route Start Last Admin





  Trade Name Freq  PRN Reason Stop Dose Admin


 


Acetaminophen  650 mg  11/21/19 22:07  11/24/19 10:45





  Tylenol  PO   650 mg





  Q4H PRN   Administration





  Pain, Mild (1-3)  


 


Atorvastatin Calcium  80 mg  11/22/19 22:00  11/23/19 22:03





  Lipitor  PO   80 mg





  QHS OCTAVIANO   Administration


 


Bisacodyl  10 mg  11/21/19 22:07 





  Dulcolax  IN  





  QDAY PRN  





  Constipation  


 


Gabapentin  300 mg  11/23/19 23:30  11/24/19 10:45





  Gabapentin  PO   300 mg





  TID OCTAVIANO   Administration


 


Hydralazine HCl  5 mg  11/22/19 02:21  11/23/19 00:12





  Apresoline  IV   5 mg





  Q6H PRN   Administration





  Hypertension  


 


Pantoprazole Sodium 80 mg/  100 mls @ 10 mls/hr  11/22/19 03:00  11/22/19 02:33





  Sodium Chloride  IV   8 mg/hr





  AS DIRECT OCTAVIANO   10 mls/hr





    Administration





  8 MG/HR  


 


Insulin Human Regular  0 units  11/24/19 07:30  11/24/19 08:00





  Humulin R  SUB-Q   Not Given





  ACHS [facility]  





  Protocol  


 


Magnesium Hydroxide  30 ml  11/21/19 22:07 





  Milk Of Magnesia  PO  





  Q4H PRN  





  Constipation  


 


Ondansetron HCl  4 mg  11/21/19 22:07  11/22/19 01:25





  Zofran  IV   4 mg





  Q8H PRN   Administration





  Nausea And Vomiting  


 


Sodium Chloride  10 ml  11/21/19 22:07 





  Sodium Chloride Flush Syringe 10 Ml  IV  





  PRN PRN  





  LINE FLUSH

## 2019-11-24 NOTE — GASTROENTEROLOGY PROGRESS NOTE
Assessment and Plan


GI: no further signs bleeding


- h/h stable


- PPI qd


- diet as tolerated


- no plans to scope 


- ok to d/c from GI standpoint, will sign off








Subjective


Date of service: 11/24/19


Interval history: 


- no GI complaints overnight








Objective





- Constitutional


Vitals: 


                                        











Temp Pulse Resp BP Pulse Ox


 


 98.2 F   68   18   113/60   94 


 


 11/24/19 08:24  11/24/19 08:24  11/24/19 08:24  11/24/19 08:24  11/24/19 08:36











General appearance: no acute distress





- EENT


Eyes: PERRL





- Respiratory


Respiratory: bilateral: CTA





- Cardiovascular


Rhythm: regular


Heart Sounds: Present: S1 & S2





- Gastrointestinal


General gastrointestinal: Present: soft, non-tender, non-distended





- Labs


CBC & Chem 7: 


                                 11/24/19 09:05





                                 11/21/19 19:40


Labs: 


                         Laboratory Results - last 24 hr











  11/23/19 11/23/19 11/24/19





  13:34 18:00 08:36


 


WBC   


 


RBC   


 


Hgb   


 


Hct   


 


MCV   


 


MCH   


 


MCHC   


 


RDW   


 


Plt Count   


 


Lymph % (Auto)   


 


Mono % (Auto)   


 


Eos % (Auto)   


 


Baso % (Auto)   


 


Lymph #   


 


Mono #   


 


Eos #   


 


Baso #   


 


Seg Neutrophils %   


 


Seg Neutrophils #   


 


POC Glucose  151 H  111 H  95














  11/24/19





  09:05


 


WBC  7.6


 


RBC  4.79


 


Hgb  15.3 H


 


Hct  44.7


 


MCV  93


 


MCH  32


 


MCHC  34


 


RDW  14.8


 


Plt Count  179


 


Lymph % (Auto)  24.6


 


Mono % (Auto)  8.7 H


 


Eos % (Auto)  3.5


 


Baso % (Auto)  0.3


 


Lymph #  1.9


 


Mono #  0.7


 


Eos #  0.3


 


Baso #  0.0


 


Seg Neutrophils %  62.9


 


Seg Neutrophils #  4.7


 


POC Glucose

## 2019-11-24 NOTE — PROGRESS NOTE
Assessment and Plan





- Patient Problems


(1) Carotid stenosis


Current Visit: Yes   Status: Acute   


Qualifiers: 


   Laterality: left   Qualified Code(s): I65.22 - Occlusion and stenosis of left

carotid artery   


Plan to address problem: 


Patient with severe left carotid stenosis CEA planned for the a.m.








(2) Hypertension


Current Visit: Yes   Status: Acute   


Qualifiers: 


   Hypertension type: essential hypertension   Qualified Code(s): I10 - 

Essential (primary) hypertension   


Plan to address problem: 


Control patient not on medications.  Only hydralazine when necessary.








(3) Stroke


Current Visit: Yes   Status: Acute   


Qualifiers: 


   CVA mechanism: unspecified   Qualified Code(s): I63.9 - Cerebral infarction, 

unspecified   


Plan to address problem: 


Chest with acute CVA.  Continue aggressive antiplatelet and antilipid therapy.  

Patient seemed to have done well with TPA.  Symptoms are resolved and now 

neurologic assistance.  Carotid endarterectomy tomorrow.  Case management and 

physical therapy to determine outpatient needs such a skilled nursing facility.








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


Current Visit: Yes   Status: Acute   


Plan to address problem: 


Patient blood sugar stable very well-controlled.








History


Interval history: 





74-year-old with a history of CHF with facial weakness and left-sided muscle 

weakness   Patient  has gone from mumbling to speak in full sentences without 

difficulty.  Dysarthria has improved.  Patient now is moving all extremities and

has no numbness at present.  .





Hospitalist Physical





- Constitutional


Vitals: 


                                        











Temp Pulse Resp BP Pulse Ox


 


 98.1 F   67   18   125/56   95 


 


 11/24/19 12:36  11/24/19 12:36  11/24/19 12:36  11/24/19 12:36  11/24/19 12:36











General appearance: Present: no acute distress





- EENT


Eyes: Present: PERRL, EOM intact


ENT: hearing intact, clear oral mucosa, dentition normal





- Neck


Neck: Present: normal ROM





- Cardiovascular


Rhythm: regular





- Extremities


Extremities: no ischemia, pulses intact, pulses symmetrical, No edema, normal 

temperature


Peripheral Pulses: within normal limits





- Abdominal


General gastrointestinal: soft, non-tender, non-distended, normal bowel sounds





- Integumentary


Integumentary: Present: clear, warm, dry





- Psychiatric


Psychiatric: appropriate mood/affect





- Neurologic


Neurologic: focal deficits, other (dysarthria)





Results





- Labs


CBC & Chem 7: 


                                 11/24/19 09:05





                                 11/21/19 19:40


Labs: 


                             Laboratory Last Values











WBC  7.6 K/mm3 (4.5-11.0)   11/24/19  09:05    


 


RBC  4.79 M/mm3 (3.65-5.03)   11/24/19  09:05    


 


Hgb  15.3 gm/dl (11.8-15.2)  H  11/24/19  09:05    


 


Hct  44.7 % (35.5-45.6)   11/24/19  09:05    


 


MCV  93 fl (84-94)   11/24/19  09:05    


 


MCH  32 pg (28-32)   11/24/19  09:05    


 


MCHC  34 % (32-34)   11/24/19  09:05    


 


RDW  14.8 % (13.2-15.2)   11/24/19  09:05    


 


Plt Count  179 K/mm3 (140-440)   11/24/19  09:05    


 


Lymph % (Auto)  24.6 % (13.4-35.0)   11/24/19  09:05    


 


Mono % (Auto)  8.7 % (0.0-7.3)  H  11/24/19  09:05    


 


Eos % (Auto)  3.5 % (0.0-4.3)   11/24/19  09:05    


 


Baso % (Auto)  0.3 % (0.0-1.8)   11/24/19  09:05    


 


Lymph #  1.9 K/mm3 (1.2-5.4)   11/24/19  09:05    


 


Mono #  0.7 K/mm3 (0.0-0.8)   11/24/19  09:05    


 


Eos #  0.3 K/mm3 (0.0-0.4)   11/24/19  09:05    


 


Baso #  0.0 K/mm3 (0.0-0.1)   11/24/19  09:05    


 


Seg Neutrophils %  62.9 % (40.0-70.0)   11/24/19  09:05    


 


Seg Neutrophils #  4.7 K/mm3 (1.8-7.7)   11/24/19  09:05    


 


PT  18.3 Sec. (12.2-14.9)  H  11/21/19  19:40    


 


INR  1.54  (0.87-1.13)  H  11/21/19  19:40    


 


APTT  47.9 Sec. (24.2-36.6)  H  11/21/19  19:40    


 


Thrombin Time  43.9 Sec. (15.1-19.6)  H  11/21/19  19:40    


 


Sodium  136 mmol/L (137-145)  L  11/21/19  19:40    


 


Potassium  4.4 mmol/L (3.6-5.0)   11/21/19  19:40    


 


Chloride  98.9 mmol/L ()   11/21/19  19:40    


 


Carbon Dioxide  19 mmol/L (22-30)  L  11/21/19  19:40    


 


Anion Gap  23 mmol/L  11/21/19  19:40    


 


BUN  14 mg/dL (9-20)   11/21/19  19:40    


 


Creatinine  0.8 mg/dL (0.8-1.5)   11/21/19  19:40    


 


Estimated GFR  > 60 ml/min  11/21/19  19:40    


 


BUN/Creatinine Ratio  18 %  11/21/19  19:40    


 


Glucose  134 mg/dL ()  H  11/21/19  19:40    


 


POC Glucose  144  ()  H  11/24/19  11:54    


 


Calcium  9.8 mg/dL (8.4-10.2)   11/21/19  19:40    


 


Total Bilirubin  0.30 mg/dL (0.1-1.2)   11/21/19  19:40    


 


Direct Bilirubin  < 0.2 mg/dL (0-0.2)   11/21/19  19:40    


 


AST  11 units/L (5-40)   11/21/19  19:40    


 


ALT  5 units/L (7-56)  L  11/21/19  19:40    


 


Alkaline Phosphatase  106 units/L ()   11/21/19  19:40    


 


Troponin T  < 0.010 ng/mL (0.00-0.029)   11/21/19  19:40    


 


Total Protein  8.2 g/dL (6.3-8.2)   11/21/19  19:40    


 


Albumin  4.5 g/dL (3.9-5)   11/21/19  19:40    


 


Albumin/Globulin Ratio  1.2 %  11/21/19  19:40    


 


Triglycerides  89 mg/dL (2-149)   11/22/19  03:59    


 


Cholesterol  153 mg/dL ()   11/22/19  03:59    


 


LDL Cholesterol Direct  108 mg/dL ()   11/22/19  03:59    


 


HDL Cholesterol  34 mg/dL (40-59)  L  11/22/19  03:59    


 


Cholesterol/HDL Ratio  4.50 %  11/22/19  03:59    


 


TSH  0.943 mlU/mL (0.270-4.200)   11/21/19  19:40    


 


Blood Type  O POSITIVE   11/21/19  19:40    


 


Antibody Screen  Negative   11/21/19  19:40    














Active Medications





- Current Medications


Current Medications: 














Generic Name Dose Route Start Last Admin





  Trade Name Freq  PRN Reason Stop Dose Admin


 


Acetaminophen  650 mg  11/21/19 22:07  11/24/19 10:45





  Tylenol  PO   650 mg





  Q4H PRN   Administration





  Pain, Mild (1-3)  


 


Atorvastatin Calcium  80 mg  11/22/19 22:00  11/23/19 22:03





  Lipitor  PO   80 mg





  QHS OCTAVIANO   Administration


 


Bisacodyl  10 mg  11/21/19 22:07 





  Dulcolax  SC  





  QDAY PRN  





  Constipation  


 


Gabapentin  300 mg  11/23/19 23:30  11/24/19 10:45





  Gabapentin  PO   300 mg





  TID OCTAVIANO   Administration


 


Hydralazine HCl  5 mg  11/22/19 02:21  11/23/19 00:12





  Apresoline  IV   5 mg





  Q6H PRN   Administration





  Hypertension  


 


Pantoprazole Sodium 80 mg/  100 mls @ 10 mls/hr  11/22/19 03:00  11/22/19 02:33





  Sodium Chloride  IV   8 mg/hr





  AS DIRECT OCTAVIANO   10 mls/hr





    Administration





  8 MG/HR  


 


Insulin Human Regular  0 units  11/24/19 07:30  11/24/19 08:00





  Humulin R  SUB-Q   Not Given





  [facility] OCTAVIANO  





  Protocol  


 


Magnesium Hydroxide  30 ml  11/21/19 22:07 





  Milk Of Magnesia  PO  





  Q4H PRN  





  Constipation  


 


Ondansetron HCl  4 mg  11/21/19 22:07  11/22/19 01:25





  Zofran  IV   4 mg





  Q8H PRN   Administration





  Nausea And Vomiting  


 


Sodium Chloride  10 ml  11/21/19 22:07 





  Sodium Chloride Flush Syringe 10 Ml  IV  





  PRN PRN  





  LINE FLUSH

## 2019-11-25 LAB
ALBUMIN SERPL-MCNC: 3.7 G/DL (ref 3.9–5)
ALT SERPL-CCNC: < 5 UNITS/L (ref 7–56)
BUN SERPL-MCNC: 17 MG/DL (ref 9–20)
BUN/CREAT SERPL: 24 %
CALCIUM SERPL-MCNC: 9.2 MG/DL (ref 8.4–10.2)
HEMOLYSIS INDEX: 12

## 2019-11-25 PROCEDURE — 03CL0ZZ EXTIRPATION OF MATTER FROM LEFT INTERNAL CAROTID ARTERY, OPEN APPROACH: ICD-10-PCS | Performed by: SURGERY

## 2019-11-25 PROCEDURE — 03UL0KZ SUPPLEMENT LEFT INTERNAL CAROTID ARTERY WITH NONAUTOLOGOUS TISSUE SUBSTITUTE, OPEN APPROACH: ICD-10-PCS | Performed by: SURGERY

## 2019-11-25 RX ADMIN — INSULIN HUMAN SCH: 100 INJECTION, SOLUTION PARENTERAL at 22:30

## 2019-11-25 RX ADMIN — CLINDAMYCIN IN 5 PERCENT DEXTROSE SCH MLS/HR: 12 INJECTION, SOLUTION INTRAVENOUS at 21:55

## 2019-11-25 RX ADMIN — HYDROMORPHONE HYDROCHLORIDE PRN MG: 1 INJECTION, SOLUTION INTRAMUSCULAR; INTRAVENOUS; SUBCUTANEOUS at 18:13

## 2019-11-25 RX ADMIN — GABAPENTIN SCH: 300 CAPSULE ORAL at 08:00

## 2019-11-25 RX ADMIN — OXYCODONE AND ACETAMINOPHEN PRN TAB: 5; 325 TABLET ORAL at 21:40

## 2019-11-25 RX ADMIN — PANTOPRAZOLE SODIUM SCH: 40 TABLET, DELAYED RELEASE ORAL at 10:30

## 2019-11-25 RX ADMIN — DEXTROSE, SODIUM CHLORIDE, AND POTASSIUM CHLORIDE SCH MLS/HR: 5; .45; .15 INJECTION INTRAVENOUS at 21:24

## 2019-11-25 RX ADMIN — HYDROMORPHONE HYDROCHLORIDE PRN MG: 1 INJECTION, SOLUTION INTRAMUSCULAR; INTRAVENOUS; SUBCUTANEOUS at 20:20

## 2019-11-25 RX ADMIN — HYDROMORPHONE HYDROCHLORIDE PRN MG: 1 INJECTION, SOLUTION INTRAMUSCULAR; INTRAVENOUS; SUBCUTANEOUS at 17:44

## 2019-11-25 RX ADMIN — GABAPENTIN SCH MG: 300 CAPSULE ORAL at 21:40

## 2019-11-25 RX ADMIN — INSULIN HUMAN SCH: 100 INJECTION, SOLUTION PARENTERAL at 11:12

## 2019-11-25 NOTE — POST ANESTHESIA EVALUATION
- Post Anesthesia Evaluation


Patient Participated: Yes (returned to baseline mentation )


Airway Patent: Yes


Stable Respiratory Function: Yes


Nausea/Vomiting: No


Temp > 96.8F: Yes


Pain Manageable: Yes


Adequeate Hydration: Yes


Anesthesia Complications: No

## 2019-11-25 NOTE — PROGRESS NOTE
Assessment and Plan





- Patient Problems


(1) Carotid stenosis


Current Visit: Yes   Status: Acute   


Qualifiers: 


   Laterality: left   Qualified Code(s): I65.22 - Occlusion and stenosis of left

carotid artery   


Plan to address problem: 


Patient with significant carotid stenosis status post endarterectomy today.  

Patient in ICU recovering.  Antiplatelet therapy to be initiated antilipid 

therapy as well.








(2) Hypertension


Current Visit: Yes   Status: Acute   


Qualifiers: 


   Hypertension type: essential hypertension   Qualified Code(s): I10 - 

Essential (primary) hypertension   


Plan to address problem: 


At present patient continues to have optimal control blood pressure continue 

present medical management.








(3) Stroke


Current Visit: Yes   Status: Acute   


Qualifiers: 


   CVA mechanism: unspecified   Qualified Code(s): I63.9 - Cerebral infarction, 

unspecified   


Plan to address problem: 


Chest with acute CVA.  Continue aggressive antiplatelet and antilipid therapy.  

Patient seemed to have done well with TPA.  Symptoms are resolved and now 

neurologic assistance.  Status post endarterectomy.  MRI shows acute changes in 

the left frontal and left parietal cortex.  Case management and physical therapy

to determine outpatient needs such a skilled nursing facility.








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


Current Visit: Yes   Status: Acute   


Plan to address problem: 


Patient blood sugar stable very well-controlled.








History


Interval history: 





74-year-old with a history of CHF with facial weakness and left-sided muscle 

weakness   Patient  has gone from mumbling to speak in full sentences without 

difficulty.  Dysarthria has improved.  Patient now is moving all extremities and

has no numbness at present.  . Patient had significant coronary artery disease 

and scheduled for endarterectomy today.





Hospitalist Physical





- Constitutional


Vitals: 


                                        











Temp Pulse Resp BP Pulse Ox


 


 97.1 F L  57 L  16   132/48   99 


 


 11/25/19 18:15  11/25/19 18:28  11/25/19 18:28  11/25/19 18:28  11/25/19 18:28











General appearance: Present: no acute distress





- EENT


Eyes: Present: PERRL, EOM intact


ENT: hearing intact, clear oral mucosa, dentition normal





- Neck


Neck: Present: supple, normal ROM





- Respiratory


Respiratory effort: normal


Respiratory: bilateral: CTA





- Cardiovascular


Rhythm: regular





- Extremities


Extremities: pulses intact


Peripheral Pulses: within normal limits





- Abdominal


General gastrointestinal: soft, non-tender, non-distended





- Integumentary


Integumentary: Present: clear, warm, dry





- Neurologic


Neurologic: focal deficits





Results





- Labs


CBC & Chem 7: 


                                 11/24/19 09:05





                                 11/25/19 12:00


Labs: 


                             Laboratory Last Values











WBC  7.6 K/mm3 (4.5-11.0)   11/24/19  09:05    


 


RBC  4.79 M/mm3 (3.65-5.03)   11/24/19  09:05    


 


Hgb  15.3 gm/dl (11.8-15.2)  H  11/24/19  09:05    


 


Hct  44.7 % (35.5-45.6)   11/24/19  09:05    


 


MCV  93 fl (84-94)   11/24/19  09:05    


 


MCH  32 pg (28-32)   11/24/19  09:05    


 


MCHC  34 % (32-34)   11/24/19  09:05    


 


RDW  14.8 % (13.2-15.2)   11/24/19  09:05    


 


Plt Count  179 K/mm3 (140-440)   11/24/19  09:05    


 


Lymph % (Auto)  24.6 % (13.4-35.0)   11/24/19  09:05    


 


Mono % (Auto)  8.7 % (0.0-7.3)  H  11/24/19  09:05    


 


Eos % (Auto)  3.5 % (0.0-4.3)   11/24/19  09:05    


 


Baso % (Auto)  0.3 % (0.0-1.8)   11/24/19  09:05    


 


Lymph #  1.9 K/mm3 (1.2-5.4)   11/24/19  09:05    


 


Mono #  0.7 K/mm3 (0.0-0.8)   11/24/19  09:05    


 


Eos #  0.3 K/mm3 (0.0-0.4)   11/24/19  09:05    


 


Baso #  0.0 K/mm3 (0.0-0.1)   11/24/19  09:05    


 


Seg Neutrophils %  62.9 % (40.0-70.0)   11/24/19  09:05    


 


Seg Neutrophils #  4.7 K/mm3 (1.8-7.7)   11/24/19  09:05    


 


PT  18.3 Sec. (12.2-14.9)  H  11/21/19  19:40    


 


INR  1.54  (0.87-1.13)  H  11/21/19  19:40    


 


APTT  47.9 Sec. (24.2-36.6)  H  11/21/19  19:40    


 


Thrombin Time  43.9 Sec. (15.1-19.6)  H  11/21/19  19:40    


 


Activated Clotting Time  125  ()   11/25/19  14:20    


 


Sodium  135 mmol/L (137-145)  L  11/25/19  12:00    


 


Potassium  4.0 mmol/L (3.6-5.0)   11/25/19  12:00    


 


Chloride  98.3 mmol/L ()   11/25/19  12:00    


 


Carbon Dioxide  21 mmol/L (22-30)  L  11/25/19  12:00    


 


Anion Gap  20 mmol/L  11/25/19  12:00    


 


BUN  17 mg/dL (9-20)   11/25/19  12:00    


 


Creatinine  0.7 mg/dL (0.8-1.5)  L  11/25/19  12:00    


 


Estimated GFR  > 60 ml/min  11/25/19  12:00    


 


BUN/Creatinine Ratio  24 %  11/25/19  12:00    


 


Glucose  120 mg/dL ()  H  11/25/19  12:00    


 


POC Glucose  130  ()  H  11/25/19  08:56    


 


Calcium  9.2 mg/dL (8.4-10.2)   11/25/19  12:00    


 


Total Bilirubin  0.40 mg/dL (0.1-1.2)   11/25/19  12:00    


 


Direct Bilirubin  < 0.2 mg/dL (0-0.2)   11/21/19  19:40    


 


AST  14 units/L (5-40)   11/25/19  12:00    


 


ALT  < 5 units/L (7-56)  L  11/25/19  12:00    


 


Alkaline Phosphatase  89 units/L ()   11/25/19  12:00    


 


Troponin T  < 0.010 ng/mL (0.00-0.029)   11/21/19  19:40    


 


Total Protein  7.0 g/dL (6.3-8.2)   11/25/19  12:00    


 


Albumin  3.7 g/dL (3.9-5)  L  11/25/19  12:00    


 


Albumin/Globulin Ratio  1.1 %  11/25/19  12:00    


 


Triglycerides  89 mg/dL (2-149)   11/22/19  03:59    


 


Cholesterol  153 mg/dL ()   11/22/19  03:59    


 


LDL Cholesterol Direct  108 mg/dL ()   11/22/19  03:59    


 


HDL Cholesterol  34 mg/dL (40-59)  L  11/22/19  03:59    


 


Cholesterol/HDL Ratio  4.50 %  11/22/19  03:59    


 


TSH  0.943 mlU/mL (0.270-4.200)   11/21/19  19:40    


 


Blood Type  O POSITIVE   11/25/19  07:58    


 


Antibody Screen  Negative   11/25/19  07:58    














- Imaging and Cardiology


MRI - head: report reviewed, image reviewed





Active Medications





- Current Medications


Current Medications: 














Generic Name Dose Route Start Last Admin





  Trade Name Freq  PRN Reason Stop Dose Admin


 


Acetaminophen  650 mg  11/21/19 22:07  11/24/19 10:45





  Tylenol  PO   650 mg





  Q4H PRN   Administration





  Pain, Mild (1-3)  


 


Atorvastatin Calcium  80 mg  11/22/19 22:00  11/24/19 21:42





  Lipitor  PO   80 mg





  [facility] OCTAVIANO   Administration


 


Bisacodyl  10 mg  11/21/19 22:07 





  Dulcolax  MI  





  QDAY PRN  





  Constipation  


 


Gabapentin  300 mg  11/23/19 23:30  11/25/19 08:00





  Gabapentin  PO   Not Given





  TID OCTAVIANO  


 


Hydralazine HCl  5 mg  11/22/19 02:21  11/23/19 00:12





  Apresoline  IV   5 mg





  Q6H PRN   Administration





  Hypertension  


 


Hydromorphone HCl  0.25 mg  11/25/19 11:30  11/25/19 18:13





  Dilaudid  IV   0.25 mg





  Q10MIN PRN   Administration





  Pain, Moderate (4-6)  


 


Lactated Ringer's  1,000 mls @ 75 mls/hr  11/25/19 12:00  11/25/19 11:35





  Lactated Ringers  IV   75 mls/hr





  AS DIRECT OCTAVIANO   Administration


 


Clindamycin HCl  900 mg in 50 mls @ 100 mls/hr  11/25/19 13:02 





  Cleocin 900 Mg/50 Ml  IV  11/25/19 23:59 





  PREOP NR  





  Protocol  


 


Insulin Human Regular  0 units  11/24/19 07:30  11/25/19 11:12





  Humulin R  SUB-Q   Not Given





  [facility] OCTAVIANO  





  Protocol  


 


Magnesium Hydroxide  30 ml  11/21/19 22:07 





  Milk Of Magnesia  PO  





  Q4H PRN  





  Constipation  


 


Midazolam HCl  2 mg  11/25/19 12:00  11/25/19 13:54





  Versed  IV  11/25/19 23:59  Not Given





  PREOP NR  


 


Ondansetron HCl  4 mg  11/21/19 22:07  11/22/19 01:25





  Zofran  IV   4 mg





  Q8H PRN   Administration





  Nausea And Vomiting  


 


Pantoprazole Sodium  40 mg  11/25/19 10:00  11/25/19 10:30





  Protonix  PO   Not Given





  DAILY OCTAVIANO  


 


Sodium Chloride  10 ml  11/21/19 22:07 





  Sodium Chloride Flush Syringe 10 Ml  IV  





  PRN PRN  





  LINE FLUSH

## 2019-11-25 NOTE — ANESTHESIA CONSULTATION
Anesthesia Consult and Med Hx


Date of service: 11/25/19





- Airway


Anesthetic Teeth Evaluation: Dentures


ROM Head & Neck: Adequate


Mental/Hyoid Distance: Adequate


Mallampati Class: Class II


Intubation Access Assessment: Good





- Pulmonary Exam


CTA: Yes





- Cardiac Exam


Cardiac Exam: RRR





- Pre-Operative Health Status


ASA Pre-Surgery Classification: ASA3


Proposed Anesthetic Plan: General (CHF, HTN, CVA x4, Carotid stenosis for GA for

CEA)





- Pulmonary


Hx Smoking: Yes


Hx Asthma: No


SOB: Yes


COPD: Yes


Hx Pneumonia: Yes


Hx Sleep Apnea: Yes





- Cardiovascular System


Hx Hypertension: Yes


Hx Coronary Artery Disease: Yes


Hx Heart Attack/AMI: No


Hx Angina: No


Hx Percutaneous Transluminal Coronary Angioplasty (PTCA): No


Hx Pacemaker: No


Hx Internal Defibrillator: No


Hx Valvular Heart Disease: No


Hx Heart Murmur: No


Hx Peripheral Vascular Disease: No





- Central Nervous System


Hx Back Pain: Yes





- Endocrine


Hx Liver Disease: No


Hx Hypothyroidism: No


Hx Hyperthyroidism: No





- Hematic


Hx Anemia: No


Hx Sickle Cell Disease: No





- Other Systems


Hx Alcohol Use: Yes (20 years ago)


Hx Substance Use: No


Hx Cancer: No

## 2019-11-25 NOTE — POST OPERATIVE NOTE
Pre-op diagnosis: Symptomatic Severe Left-sided Carotid Stenosis 


Post-op diagnosis: same


Findings: 





Left Carotid Endarterectomy and Patch Angioplasty with Bovine Pericardium 


Anesthesia: LI


Surgeon: MAGNO HAMMER


Estimated blood loss: other (50ml)


Pathology: none


Condition: stable


Disposition: PACU

## 2019-11-25 NOTE — MAGNETIC RESONANCE REPORT
MRI BRAIN 11/25/2019



INDICATION / CLINICAL INFORMATION:

stroke. Left-sided facial droop. Speech disturbance.



TECHNIQUE: 

Multiplanar, multisequence MR images of the brain were obtained.





COMPARISON: 

CT brain 11/22/2019



FINDINGS:



BRAIN / INTRACRANIAL CONTENTS: Unenhanced MR images of the brain demonstrate patchy areas of restrict
ed diffusion in the left frontal cortex, near the apex of the sylvian fissure. This is consistent wit
h acute ischemic injury in the distribution of one of the branch vessels of the left middle cerebral 
artery. This covers an area of approximately 2.8 cm. There may be some additional small foci of restr
icted diffusion in the more posterior left parietal cortex, also within the distribution of the left 
MCA.



There is no evidence of associated hemorrhage.



Underlying diffuse cerebral atrophy and moderate chronic white matter T2 weighted hyperintensities ar
e present. An old or more focal left lacunar infarcts present near the left caudate head.



There is no evidence of hemorrhage. There are no abnormal extra-axial fluid collections.



 



EXTRACRANIAL: Unremarkable



CRANIOCERVICAL JUNCTION: No significant abnormality.



VASCULAR FLOW-VOIDS: No significant abnormality.









IMPRESSION:

 Acute ischemic changes in the left frontal and parietal cortex as described above. No evidence of he
morrhage.



Chronic and age-related changes. 



Signer Name: Jose Alfredo Wahl MD 

Signed: 11/25/2019 10:18 AM

 Workstation Name: [id number]

## 2019-11-25 NOTE — OPERATIVE REPORT
STAFF SURGEON:  Dr. Jabari Rutherford.



PREOPERATIVE DIAGNOSIS:  Symptomatic severe left-sided carotid stenosis.



POSTOPERATIVE DIAGNOSIS:  Symptomatic severe left-sided carotid stenosis.



PROCEDURE PERFORMED:  Left carotid endarterectomy and patch angioplasty with

bovine pericardium.



COMPLICATIONS:  None.



ESTIMATED BLOOD LOSS:  50 mL.



ANESTHESIA:  General.



INDICATIONS FOR PROCEDURE:  This is a 74-year-old gentleman who was hospitalized

with acute cerebrovascular accident.  This is associated with severe stenosis of

the left carotid endarterectomy.  The patient was therefore recommended to

undergo operative intervention for stroke risk reduction.  The patient was

explained the risks, benefits and alternatives to the procedure, expressed

understanding and wished to proceed.



DESCRIPTION OF PROCEDURE:  After appropriate consent was obtained, the patient

was brought back to the operating room and placed on the operating table in

supine position.  The patient was given general anesthesia, was intubated

without difficulty.  The left neck and chest were prepped and draped in usual

sterile fashion with ChloraPrep.  Appropriate preoperative antibiotics were

administered.  Appropriate timeout was performed indicating correct patient,

procedure, and site of procedure.  We then began the operation by making a

longitudinal incision along the sternocleidomastoid muscle.  This was carried

through subcutaneous tissue and platysma muscle with a combination of blunt

dissection and electrocautery.  Dissection was continued along the anterior

border of the sternocleidomastoid muscle into the internal jugular vein was

identified.  The facial vein was identified and mobilized.  This was clamped

with 2 hemostats and transected and then suture ligated x 2 with a 2-0 silk

suture.  The self-retaining retractor was placed, it was allowed to retract the

internal jugular vein posteriorly and dissection was continued onto the common

carotid artery.  This was mobilized and controlled with umbilical tape and a

Darci.  Dissection was then continued distally and the superior thyroid,

external carotid and internal carotid arteries were all controlled with vessel

loops.  The patient was then given unfractionated heparin intravenously and ACTs

were obtained throughout the remainder of the case for adequate anticoagulation.

 After appropriate timeout elapsed, I then proceeded to place vascular clamps on

the internal, external and common carotid arteries.  A longitudinal arteriotomy

was made on the common carotid artery with a #11 blade and extended onto the

internal with Cisneros scissors.  We then proceeded to place a 10-[place]

shunt after it was adequately prepped on the back table.  Once the shunt was in

place, we then proceeded to perform our endarterectomy starting on the common

carotid using a Joshua elevator and this was then extended onto the external

carotid, which was able to perform eversion of the plaque at this level and then

continued onto the internal carotid artery, which feathered out nicely.  We then

proceeded to remove any loose debris using micro pickups.  Once we were

satisfied with the endarterectomized surface, we then proceeded to tack the

distal flap with interrupted 7-0 Prolene suture.  Once that was complete, we

then proceeded to perform a patch angioplasty with a bovine pericardium with a

running 6-0 Prolene suture.  Prior to completion, we proceeded to clamp the

shunt with two straight clamps.  It was transected and it was removed from the

internal carotid artery first and allowed adequate back bleeding and then a

vascular clamp was placed and then from the common carotid artery again allowing

flush and then a vascular clamp was placed.  We then proceeded to backbleed from

the external and then flushed the carotid with heparinized saline.  We then

completed the anastomosis and flow was then reestablished first from the

external carotid, then the common carotid arteries for several beats and then

the internal clamp was then released.  I then brought on a Doppler on the field,

which demonstrated adequate signals from the internal, external and common

carotid arteries.  Once that was completed, I then looked to obtain hemostasis

along the suture line was obtained with hemostatic agents.  The patient was

given protamine for heparinization reversal.  We then proceeded to make a stab

incision just inferior to the incision with a 15 blade.  A  Carol clamp was then

used to bring through a #7 flat GEREMIAS drain through the stab incision and the drain

was cut to an appropriate length and sutured in place with 3-0 nylon.  Once we

were satisfied with hemostasis, we then proceeded to close the wound by

approximating the sternocleidomastoid muscles with interrupted 2-0 PDS and then

the platysma muscle was approximated with a running 3-0 PDS and then skin was

approximated with a 4-0 Monocryl.  Appropriate Dermabond dressing was placed. 

The patient tolerated the procedure well, emerged from the general anesthesia,

was extubated in the operating room.  His neurological status was at baseline,

which he was moving all his extremities.  He was then sent to recovery in stable

condition.  All the sponges, instrument and needle counts were correct at

completion of the operation.





DD: 11/25/2019 20:50

DT: 11/25/2019 21:56

JOB# [id number]  [id number]

VCN/NTS

## 2019-11-26 RX ADMIN — GABAPENTIN SCH MG: 300 CAPSULE ORAL at 21:41

## 2019-11-26 RX ADMIN — INSULIN HUMAN SCH: 100 INJECTION, SOLUTION PARENTERAL at 08:13

## 2019-11-26 RX ADMIN — GABAPENTIN SCH MG: 300 CAPSULE ORAL at 15:00

## 2019-11-26 RX ADMIN — GABAPENTIN SCH: 300 CAPSULE ORAL at 06:50

## 2019-11-26 RX ADMIN — INSULIN HUMAN SCH: 100 INJECTION, SOLUTION PARENTERAL at 06:50

## 2019-11-26 RX ADMIN — INSULIN HUMAN SCH: 100 INJECTION, SOLUTION PARENTERAL at 22:57

## 2019-11-26 RX ADMIN — OXYCODONE AND ACETAMINOPHEN PRN TAB: 5; 325 TABLET ORAL at 22:57

## 2019-11-26 RX ADMIN — OXYCODONE AND ACETAMINOPHEN PRN TAB: 5; 325 TABLET ORAL at 01:48

## 2019-11-26 RX ADMIN — INSULIN HUMAN SCH: 100 INJECTION, SOLUTION PARENTERAL at 18:11

## 2019-11-26 RX ADMIN — INSULIN HUMAN SCH: 100 INJECTION, SOLUTION PARENTERAL at 16:57

## 2019-11-26 RX ADMIN — HYDROMORPHONE HYDROCHLORIDE PRN MG: 1 INJECTION, SOLUTION INTRAMUSCULAR; INTRAVENOUS; SUBCUTANEOUS at 05:29

## 2019-11-26 RX ADMIN — HYDROMORPHONE HYDROCHLORIDE PRN MG: 1 INJECTION, SOLUTION INTRAMUSCULAR; INTRAVENOUS; SUBCUTANEOUS at 18:39

## 2019-11-26 RX ADMIN — CLINDAMYCIN IN 5 PERCENT DEXTROSE SCH MLS/HR: 12 INJECTION, SOLUTION INTRAVENOUS at 05:28

## 2019-11-26 RX ADMIN — CLOPIDOGREL BISULFATE SCH MG: 75 TABLET ORAL at 11:00

## 2019-11-26 RX ADMIN — PANTOPRAZOLE SODIUM SCH MG: 40 TABLET, DELAYED RELEASE ORAL at 11:00

## 2019-11-26 RX ADMIN — GABAPENTIN SCH MG: 300 CAPSULE ORAL at 08:04

## 2019-11-26 NOTE — PROGRESS NOTE
Assessment and Plan


Assessment and plan: 


Patient is a 75 yo  man with a history of CHF, coronary artery disease,

diabetes, carotid stenosis, COPD and prior CVA  who presented with aphasia and 

facial droop. He received tpa and now able to mumble a couple of words. Patient 

has a history of carotid stenosis, refused surgery in the past per chart. 





* CTA head and neck reviewed


* Left Carotid Endarterectomy and Patch Angioplasty with Bovine Pericardium with

  vascular surgeon


* Passed swallow eval this am


* MRI consistent with Acute Ischemic changes in the left frontal and parietal 

  cortex with no evidence of hemorrhage





Assessment


Acute CVA, With Left Hemiplegia AND Dysathria: status post TPA: Neurology input 

noted, stroke protocol, PT/OT eval


Carotid stenosis: Vascular surgeon consulted, input noted-S/P Left Carotid 

Endarterectomy and Patch Angioplasty with Bovine Pericardium


CHF, stable


coronary artery disease: Continue statin and antiplatete


HTN


diabetes: Accu check/Ac/hs


COPD


Coffee ground emesis- Evaluated by GI, stable, PPI recommended, no plan for 

scope at this time


DVT/GI prophy


Transfer to Medicine if ok with managing team. 








History


Interval history: 


Patient seen and examined, remains with dysathria which is not new. Patient is 

clinically stable following the CEA








Hospitalist Physical





- Physical exam


Narrative exam: 


General appearance: Present: no acute distress





- EENT


Eyes: Present: PERRL, EOM intact


ENT: hearing intact, clear oral mucosa, dentition normal





- Neck


Neck: Present: supple, normal ROM





- Respiratory


Respiratory effort: normal


Respiratory: bilateral: CTA





- Cardiovascular


Rhythm: regular





- Extremities


Extremities: pulses intact


Peripheral Pulses: within normal limits





- Abdominal


General gastrointestinal: soft, non-tender, non-distended





- Integumentary


Integumentary: Present: clear, warm, dry





- Neurologic


Neurologic: focal deficits, DYsathria








- Constitutional


Vitals: 


                                        











Temp Pulse Resp BP Pulse Ox


 


 97.6 F   68   18   120/41   100 


 


 11/26/19 04:00  11/26/19 03:45  11/26/19 05:50  11/25/19 22:41  11/26/19 07:50











General appearance: Present: no acute distress





Results





- Labs


CBC & Chem 7: 


                                 11/24/19 09:05





                                 11/25/19 12:00


Labs: 


                             Laboratory Last Values











WBC  7.6 K/mm3 (4.5-11.0)   11/24/19  09:05    


 


RBC  4.79 M/mm3 (3.65-5.03)   11/24/19  09:05    


 


Hgb  15.3 gm/dl (11.8-15.2)  H  11/24/19  09:05    


 


Hct  44.7 % (35.5-45.6)   11/24/19  09:05    


 


MCV  93 fl (84-94)   11/24/19  09:05    


 


MCH  32 pg (28-32)   11/24/19  09:05    


 


MCHC  34 % (32-34)   11/24/19  09:05    


 


RDW  14.8 % (13.2-15.2)   11/24/19  09:05    


 


Plt Count  179 K/mm3 (140-440)   11/24/19  09:05    


 


Lymph % (Auto)  24.6 % (13.4-35.0)   11/24/19  09:05    


 


Mono % (Auto)  8.7 % (0.0-7.3)  H  11/24/19  09:05    


 


Eos % (Auto)  3.5 % (0.0-4.3)   11/24/19  09:05    


 


Baso % (Auto)  0.3 % (0.0-1.8)   11/24/19  09:05    


 


Lymph #  1.9 K/mm3 (1.2-5.4)   11/24/19  09:05    


 


Mono #  0.7 K/mm3 (0.0-0.8)   11/24/19  09:05    


 


Eos #  0.3 K/mm3 (0.0-0.4)   11/24/19  09:05    


 


Baso #  0.0 K/mm3 (0.0-0.1)   11/24/19  09:05    


 


Seg Neutrophils %  62.9 % (40.0-70.0)   11/24/19  09:05    


 


Seg Neutrophils #  4.7 K/mm3 (1.8-7.7)   11/24/19  09:05    


 


PT  18.3 Sec. (12.2-14.9)  H  11/21/19  19:40    


 


INR  1.54  (0.87-1.13)  H  11/21/19  19:40    


 


APTT  47.9 Sec. (24.2-36.6)  H  11/21/19  19:40    


 


Thrombin Time  43.9 Sec. (15.1-19.6)  H  11/21/19  19:40    


 


Activated Clotting Time  125  ()   11/25/19  14:20    


 


Sodium  135 mmol/L (137-145)  L  11/25/19  12:00    


 


Potassium  4.0 mmol/L (3.6-5.0)   11/25/19  12:00    


 


Chloride  98.3 mmol/L ()   11/25/19  12:00    


 


Carbon Dioxide  21 mmol/L (22-30)  L  11/25/19  12:00    


 


Anion Gap  20 mmol/L  11/25/19  12:00    


 


BUN  17 mg/dL (9-20)   11/25/19  12:00    


 


Creatinine  0.7 mg/dL (0.8-1.5)  L  11/25/19  12:00    


 


Estimated GFR  > 60 ml/min  11/25/19  12:00    


 


BUN/Creatinine Ratio  24 %  11/25/19  12:00    


 


Glucose  120 mg/dL ()  H  11/25/19  12:00    


 


POC Glucose  106  ()  H  11/26/19  08:05    


 


Calcium  9.2 mg/dL (8.4-10.2)   11/25/19  12:00    


 


Total Bilirubin  0.40 mg/dL (0.1-1.2)   11/25/19  12:00    


 


Direct Bilirubin  < 0.2 mg/dL (0-0.2)   11/21/19  19:40    


 


AST  14 units/L (5-40)   11/25/19  12:00    


 


ALT  < 5 units/L (7-56)  L  11/25/19  12:00    


 


Alkaline Phosphatase  89 units/L ()   11/25/19  12:00    


 


Troponin T  < 0.010 ng/mL (0.00-0.029)   11/21/19  19:40    


 


Total Protein  7.0 g/dL (6.3-8.2)   11/25/19  12:00    


 


Albumin  3.7 g/dL (3.9-5)  L  11/25/19  12:00    


 


Albumin/Globulin Ratio  1.1 %  11/25/19  12:00    


 


Triglycerides  89 mg/dL (2-149)   11/22/19  03:59    


 


Cholesterol  153 mg/dL ()   11/22/19  03:59    


 


LDL Cholesterol Direct  108 mg/dL ()   11/22/19  03:59    


 


HDL Cholesterol  34 mg/dL (40-59)  L  11/22/19  03:59    


 


Cholesterol/HDL Ratio  4.50 %  11/22/19  03:59    


 


TSH  0.943 mlU/mL (0.270-4.200)   11/21/19  19:40    


 


Blood Type  O POSITIVE   11/25/19  07:58    


 


Antibody Screen  Negative   11/25/19  07:58    














Active Medications





- Current Medications


Current Medications: 














Generic Name Dose Route Start Last Admin





  Trade Name Freq  PRN Reason Stop Dose Admin


 


Acetaminophen  650 mg  11/21/19 22:07  11/24/19 10:45





  Tylenol  PO   650 mg





  Q4H PRN   Administration





  Pain, Mild (1-3)  


 


Atorvastatin Calcium  80 mg  11/22/19 22:00  11/25/19 21:40





  Lipitor  PO   80 mg





  QHS OCTAVIANO   Administration


 


Bisacodyl  10 mg  11/21/19 22:07 





  Dulcolax  [place]  





  QDAY PRN  





  Constipation  


 


Clopidogrel Bisulfate  75 mg  11/26/19 10:00 





  Plavix  PO  





  QDAY OCTAVIANO  


 


Gabapentin  300 mg  11/23/19 23:30  11/26/19 08:04





  Gabapentin  PO   300 mg





  TID OCTAVIANO   Administration


 


Hydralazine HCl  5 mg  11/22/19 02:21  11/23/19 00:12





  Apresoline  IV   5 mg





  Q6H PRN   Administration





  Hypertension  


 


Hydromorphone HCl  0.5 mg  11/25/19 20:12  11/26/19 05:29





  Dilaudid  IV   0.5 mg





  Q3H PRN   Administration





  Pain , Severe (7-10)  


 


Dopamine HCl/Dextrose  800 mg in 250 mls @ 2.873 mls/hr  11/25/19 21:00 





  Intropin Drip 800 Mg/D5w 250 Ml  IV  





  TITR OCTAVIANO  





  Protocol  





  2 MCG/KG/MIN  


 


Potassium Chloride/Dextrose/Sod Cl  20 meq in 1,000 mls @ 42 mls/hr  11/25/19 

21:00  11/25/19 21:24





  D5w/0.45% Nacl/Kcl 20 Meq  IV   42 mls/hr





  AS DIRECT OCTAVIANO   Administration


 


Sodium Nitroprusside 50 mg/  250 mls @ 5.745 mls/hr  11/25/19 21:00 





  Dextrose  IV  





  TITR OCTAVIANO  





  Protocol  





  0.25 MCG/KG/MIN  


 


Insulin Human Regular  0 units  11/24/19 07:30  11/26/19 08:13





  Humulin R  SUB-Q   Not Given





  ACHS [facility]  





  Protocol  


 


Magnesium Hydroxide  30 ml  11/21/19 22:07 





  Milk Of Magnesia  PO  





  Q4H PRN  





  Constipation  


 


Naloxone HCl  0.1 mg  11/25/19 20:12 





  Naloxone  IV  





  Q2MIN PRN  





  Res Rate </= 8 or 02 SAT < 92%  


 


Ondansetron HCl  4 mg  11/21/19 22:07  11/22/19 01:25





  Zofran  IV   4 mg





  Q8H PRN   Administration





  Nausea And Vomiting  


 


Oxycodone/Acetaminophen  1 tab  11/25/19 20:12  11/26/19 01:48





  Percocet 5/325  PO   1 tab





  Q6H PRN   Administration





  Pain, Moderate (4-6)  


 


Pantoprazole Sodium  40 mg  11/25/19 10:00  11/25/19 10:30





  Protonix  PO   Not Given





  DAILY [facility]  


 


Sodium Chloride  10 ml  11/21/19 22:07  11/26/19 05:30





  Sodium Chloride Flush Syringe 10 Ml  IV   10 ml





  PRN PRN   Administration





  LINE FLUSH

## 2019-11-26 NOTE — EVENT NOTE
Date: 11/25/19





S/p Carotid endarterectomy. ICU admission requested for post up care.


Discussed with admitting nurse in the ICU- reviewed the post op orders and blood

pressure parameters.


Patient is Dr. Wheatley patient- plan for his group to assume care in the morning.

## 2019-11-26 NOTE — PROGRESS NOTE
Assessment and Plan





POD#1 s/p Left CEA


Mild Expressive Aphasia however this is not new


Will remove GEREMIAS drain


D/C Мария esteves to transfer to telemetry from a vascular surgery standpoint





Subjective


Date of service: 11/26/19


Interval history: 





POD#1 s/p Left CEA.  No significant events overnight.  Complaining of left neck 

pain, no additional complaints.





Objective





- Exam


Narrative Exam: 





Mild Broca's aphasia





- Constitutional


Vitals: 


                               Vital Signs - 12hr











  11/25/19 11/26/19 11/26/19





  23:20 00:00 01:45


 


Temperature  97.5 F L 


 


Pulse Rate 68  68


 


Respiratory 17  18





Rate   


 


O2 Sat by Pulse 100  100





Oximetry   














  11/26/19 11/26/19 11/26/19





  01:48 02:48 03:45


 


Temperature   


 


Pulse Rate   68


 


Respiratory 19 18 18





Rate   


 


O2 Sat by Pulse   100





Oximetry   














  11/26/19 11/26/19 11/26/19





  04:00 05:29 05:50


 


Temperature 97.6 F  


 


Pulse Rate   


 


Respiratory  16 18





Rate   


 


O2 Sat by Pulse   100





Oximetry   














  11/26/19 11/26/19 11/26/19





  07:50 08:00 08:35


 


Temperature  97.9 F 


 


Pulse Rate   75


 


Respiratory  17 





Rate   


 


O2 Sat by Pulse 100 100 





Oximetry   











General appearance: Present: no acute distress





- Neck


Neck: other (incision intact without hematoma.  GEREMIAS with scant serosanginous 

drainage.)





- Respiratory


Respiratory effort: normal





- Cardiovascular


Rhythm: regular


Extremities: normal temperature





- Musculoskeletal


Musculoskeletal: strength equal bilaterally





- Neurologic


Neurologic: no focal deficits, moves all extremities





- Labs


CBC & Chem 7: 


                                 11/24/19 09:05





                                 11/25/19 12:00


Labs: 


                              Abnormal lab results











  11/25/19 11/25/19 11/25/19 Range/Units





  12:00 18:08 21:53 


 


Sodium  135 L    (137-145)  mmol/L


 


Carbon Dioxide  21 L    (22-30)  mmol/L


 


Creatinine  0.7 L    (0.8-1.5)  mg/dL


 


Glucose  120 H    ()  mg/dL


 


POC Glucose   126 H  118 H  ()  


 


ALT  < 5 L    (7-56)  units/L


 


Albumin  3.7 L    (3.9-5)  g/dL














  11/26/19 Range/Units





  08:05 


 


Sodium   (137-145)  mmol/L


 


Carbon Dioxide   (22-30)  mmol/L


 


Creatinine   (0.8-1.5)  mg/dL


 


Glucose   ()  mg/dL


 


POC Glucose  106 H  ()  


 


ALT   (7-56)  units/L


 


Albumin   (3.9-5)  g/dL














Medications & Allergies





- Medications


Allergies/Adverse Reactions: 


                                    Allergies





Penicillins Allergy (Verified 11/23/19 16:45)


   Rash








Home Medications: 


                                Home Medications











 Medication  Instructions  Recorded  Confirmed  Last Taken  Type


 


Gabapentin 300 mg PO TID 11/22/19 11/22/19 Unknown History


 


Losartan Potassium 25 mg PO DAILY 11/22/19 11/22/19 Unknown History


 


Pantoprazole 40 mg PO DAILY 11/22/19 11/22/19 Unknown History


 


metFORMIN 500 mg PO BID 11/22/19 11/22/19 Unknown History











Active Medications: 














Generic Name Dose Route Start Last Admin





  Trade Name Freq  PRN Reason Stop Dose Admin


 


Acetaminophen  650 mg  11/21/19 22:07  11/24/19 10:45





  Tylenol  PO   650 mg





  Q4H PRN   Administration





  Pain, Mild (1-3)  


 


Atorvastatin Calcium  80 mg  11/22/19 22:00  11/25/19 21:40





  Lipitor  PO   80 mg





  QHS OCTAVIANO   Administration


 


Bisacodyl  10 mg  11/21/19 22:07 





  Dulcolax  [place]  





  QDAY PRN  





  Constipation  


 


Clopidogrel Bisulfate  75 mg  11/26/19 10:00  11/26/19 11:00





  Plavix  PO   75 mg





  QDAY OCTAVIANO   Administration


 


Gabapentin  300 mg  11/23/19 23:30  11/26/19 08:04





  Gabapentin  PO   300 mg





  TID OCTAVIANO   Administration


 


Hydralazine HCl  5 mg  11/22/19 02:21  11/23/19 00:12





  Apresoline  IV   5 mg





  Q6H PRN   Administration





  Hypertension  


 


Hydromorphone HCl  0.5 mg  11/25/19 20:12  11/26/19 05:29





  Dilaudid  IV   0.5 mg





  Q3H PRN   Administration





  Pain , Severe (7-10)  


 


Dopamine HCl/Dextrose  800 mg in 250 mls @ 2.873 mls/hr  11/25/19 21:00 





  Intropin Drip 800 Mg/D5w 250 Ml  IV  





  TITR OCTAVIANO  





  Protocol  





  2 MCG/KG/MIN  


 


Potassium Chloride/Dextrose/Sod Cl  20 meq in 1,000 mls @ 42 mls/hr  11/25/19 

21:00  11/25/19 21:24





  D5w/0.45% Nacl/Kcl 20 Meq  IV   42 mls/hr





  AS DIRECT OCTAVIANO   Administration


 


Sodium Nitroprusside 50 mg/  250 mls @ 5.745 mls/hr  11/25/19 21:00 





  Dextrose  IV  





  TITR [facility]  





  Protocol  





  0.25 MCG/KG/MIN  


 


Insulin Human Regular  0 units  11/24/19 07:30  11/26/19 08:13





  Humulin R  SUB-Q   Not Given





  ACHS [facility]  





  Protocol  


 


Magnesium Hydroxide  30 ml  11/21/19 22:07 





  Milk Of Magnesia  PO  





  Q4H PRN  





  Constipation  


 


Naloxone HCl  0.1 mg  11/25/19 20:12 





  Naloxone  IV  





  Q2MIN PRN  





  Res Rate </= 8 or 02 SAT < 92%  


 


Ondansetron HCl  4 mg  11/21/19 22:07  11/22/19 01:25





  Zofran  IV   4 mg





  Q8H PRN   Administration





  Nausea And Vomiting  


 


Oxycodone/Acetaminophen  1 tab  11/25/19 20:12  11/26/19 01:48





  Percocet 5/325  PO   1 tab





  Q6H PRN   Administration





  Pain, Moderate (4-6)  


 


Pantoprazole Sodium  40 mg  11/25/19 10:00  11/26/19 11:00





  Protonix  PO   40 mg





  DAILY OCTAVIANO   Administration


 


Sodium Chloride  10 ml  11/21/19 22:07  11/26/19 05:30





  Sodium Chloride Flush Syringe 10 Ml  IV   10 ml





  PRN PRN   Administration





  LINE FLUSH

## 2019-11-27 RX ADMIN — INSULIN HUMAN SCH: 100 INJECTION, SOLUTION PARENTERAL at 22:27

## 2019-11-27 RX ADMIN — GABAPENTIN SCH MG: 300 CAPSULE ORAL at 13:50

## 2019-11-27 RX ADMIN — OXYCODONE AND ACETAMINOPHEN PRN TAB: 5; 325 TABLET ORAL at 21:37

## 2019-11-27 RX ADMIN — HYDROMORPHONE HYDROCHLORIDE PRN MG: 1 INJECTION, SOLUTION INTRAMUSCULAR; INTRAVENOUS; SUBCUTANEOUS at 04:13

## 2019-11-27 RX ADMIN — GABAPENTIN SCH MG: 300 CAPSULE ORAL at 09:49

## 2019-11-27 RX ADMIN — OXYCODONE AND ACETAMINOPHEN PRN TAB: 5; 325 TABLET ORAL at 13:50

## 2019-11-27 RX ADMIN — GABAPENTIN SCH MG: 300 CAPSULE ORAL at 21:35

## 2019-11-27 RX ADMIN — INSULIN HUMAN SCH: 100 INJECTION, SOLUTION PARENTERAL at 17:09

## 2019-11-27 RX ADMIN — DEXTROSE, SODIUM CHLORIDE, AND POTASSIUM CHLORIDE SCH MLS/HR: 5; .45; .15 INJECTION INTRAVENOUS at 04:13

## 2019-11-27 RX ADMIN — PANTOPRAZOLE SODIUM SCH MG: 40 TABLET, DELAYED RELEASE ORAL at 09:49

## 2019-11-27 RX ADMIN — INSULIN HUMAN SCH: 100 INJECTION, SOLUTION PARENTERAL at 13:00

## 2019-11-27 RX ADMIN — CLOPIDOGREL BISULFATE SCH MG: 75 TABLET ORAL at 09:49

## 2019-11-27 RX ADMIN — INSULIN HUMAN SCH: 100 INJECTION, SOLUTION PARENTERAL at 08:30

## 2019-11-27 NOTE — PROGRESS NOTE
Assessment and Plan


Assessment and plan: 


Patient is a 73 yo  man with a history of CHF, coronary artery disease,

diabetes, carotid stenosis, COPD and prior CVA  who presented with aphasia and 

facial droop. He received tpa and now able to mumble a couple of words. Patient 

has a history of carotid stenosis, refused surgery in the past per chart. 





* CTA head and neck reviewed


* Left Carotid Endarterectomy and Patch Angioplasty with Bovine Pericardium with

  vascular surgeon


* Passed swallow eval this am


* MRI consistent with Acute Ischemic changes in the left frontal and parietal 

  cortex with no evidence of hemorrhage





Assessment


Acute CVA, With Left Hemiplegia AND Dysathria: status post TPA: Neurology input 

noted, stroke protocol, PT/OT eval


Carotid stenosis: Vascular surgeon consulted, input noted-S/P Left Carotid 

Endarterectomy and Patch Angioplasty with Bovine Pericardium


CHF, stable


Coronary artery disease: Continue statin and antiplatetet


HTN:controled


Diabetes: Accu check/Ac/hs


COPD: stable


Coffee ground emesis- Evaluated by GI, stable, PPI recommended, no plan for 

scope at this time


DVT/GI prophy


PEENDING PLACEMENT








History


Interval history: 


Patient seen and examined, remains with dysathria which is not new. Patient is 

clinically stable following the CEA. No new complaints today patient is pending 

placement to rehab discharge planning was done for today but still awaiting








Hospitalist Physical





- Physical exam


Narrative exam: 


General appearance: Present: no acute distress





- EENT


Eyes: Present: PERRL, EOM intact


ENT: hearing intact, clear oral mucosa, dentition normal





- Neck


Neck: Present: supple, normal ROM





- Respiratory


Respiratory effort: normal


Respiratory: bilateral: CTA





- Cardiovascular


Rhythm: regular





- Extremities


Extremities: pulses intact


Peripheral Pulses: within normal limits





- Abdominal


General gastrointestinal: soft, non-tender, non-distended





- Integumentary


Integumentary: Present: clear, warm, dry





- Neurologic


Neurologic: focal deficits, dysarthria 








- Constitutional


Vitals: 


                                        











Temp Pulse Resp BP Pulse Ox


 


 97.9 F   75   18   131/60   92 


 


 11/27/19 16:42  11/27/19 16:42  11/27/19 16:42  11/27/19 16:42  11/27/19 16:42











General appearance: Present: no acute distress





Results





- Labs


CBC & Chem 7: 


                                 11/24/19 09:05





                                 11/25/19 12:00


Labs: 


                             Laboratory Last Values











WBC  7.6 K/mm3 (4.5-11.0)   11/24/19  09:05    


 


RBC  4.79 M/mm3 (3.65-5.03)   11/24/19  09:05    


 


Hgb  15.3 gm/dl (11.8-15.2)  H  11/24/19  09:05    


 


Hct  44.7 % (35.5-45.6)   11/24/19  09:05    


 


MCV  93 fl (84-94)   11/24/19  09:05    


 


MCH  32 pg (28-32)   11/24/19  09:05    


 


MCHC  34 % (32-34)   11/24/19  09:05    


 


RDW  14.8 % (13.2-15.2)   11/24/19  09:05    


 


Plt Count  179 K/mm3 (140-440)   11/24/19  09:05    


 


Lymph % (Auto)  24.6 % (13.4-35.0)   11/24/19  09:05    


 


Mono % (Auto)  8.7 % (0.0-7.3)  H  11/24/19  09:05    


 


Eos % (Auto)  3.5 % (0.0-4.3)   11/24/19  09:05    


 


Baso % (Auto)  0.3 % (0.0-1.8)   11/24/19  09:05    


 


Lymph #  1.9 K/mm3 (1.2-5.4)   11/24/19  09:05    


 


Mono #  0.7 K/mm3 (0.0-0.8)   11/24/19  09:05    


 


Eos #  0.3 K/mm3 (0.0-0.4)   11/24/19  09:05    


 


Baso #  0.0 K/mm3 (0.0-0.1)   11/24/19  09:05    


 


Seg Neutrophils %  62.9 % (40.0-70.0)   11/24/19  09:05    


 


Seg Neutrophils #  4.7 K/mm3 (1.8-7.7)   11/24/19  09:05    


 


PT  18.3 Sec. (12.2-14.9)  H  11/21/19  19:40    


 


INR  1.54  (0.87-1.13)  H  11/21/19  19:40    


 


APTT  47.9 Sec. (24.2-36.6)  H  11/21/19  19:40    


 


Thrombin Time  43.9 Sec. (15.1-19.6)  H  11/21/19  19:40    


 


Activated Clotting Time  153  ()  H  11/25/19  16:58    


 


Sodium  135 mmol/L (137-145)  L  11/25/19  12:00    


 


Potassium  4.0 mmol/L (3.6-5.0)   11/25/19  12:00    


 


Chloride  98.3 mmol/L ()   11/25/19  12:00    


 


Carbon Dioxide  21 mmol/L (22-30)  L  11/25/19  12:00    


 


Anion Gap  20 mmol/L  11/25/19  12:00    


 


BUN  17 mg/dL (9-20)   11/25/19  12:00    


 


Creatinine  0.7 mg/dL (0.8-1.5)  L  11/25/19  12:00    


 


Estimated GFR  > 60 ml/min  11/25/19  12:00    


 


BUN/Creatinine Ratio  24 %  11/25/19  12:00    


 


Glucose  120 mg/dL ()  H  11/25/19  12:00    


 


POC Glucose  127  ()  H  11/27/19  16:53    


 


Calcium  9.2 mg/dL (8.4-10.2)   11/25/19  12:00    


 


Total Bilirubin  0.40 mg/dL (0.1-1.2)   11/25/19  12:00    


 


Direct Bilirubin  < 0.2 mg/dL (0-0.2)   11/21/19  19:40    


 


AST  14 units/L (5-40)   11/25/19  12:00    


 


ALT  < 5 units/L (7-56)  L  11/25/19  12:00    


 


Alkaline Phosphatase  89 units/L ()   11/25/19  12:00    


 


Troponin T  < 0.010 ng/mL (0.00-0.029)   11/21/19  19:40    


 


Total Protein  7.0 g/dL (6.3-8.2)   11/25/19  12:00    


 


Albumin  3.7 g/dL (3.9-5)  L  11/25/19  12:00    


 


Albumin/Globulin Ratio  1.1 %  11/25/19  12:00    


 


Triglycerides  89 mg/dL (2-149)   11/22/19  03:59    


 


Cholesterol  153 mg/dL ()   11/22/19  03:59    


 


LDL Cholesterol Direct  108 mg/dL ()   11/22/19  03:59    


 


HDL Cholesterol  34 mg/dL (40-59)  L  11/22/19  03:59    


 


Cholesterol/HDL Ratio  4.50 %  11/22/19  03:59    


 


TSH  0.943 mlU/mL (0.270-4.200)   11/21/19  19:40    


 


Blood Type  O POSITIVE   11/25/19  07:58    


 


Antibody Screen  Negative   11/25/19  07:58    














Active Medications





- Current Medications


Current Medications: 














Generic Name Dose Route Start Last Admin





  Trade Name Freq  PRN Reason Stop Dose Admin


 


Acetaminophen  650 mg  11/21/19 22:07  11/24/19 10:45





  Tylenol  PO   650 mg





  Q4H PRN   Administration





  Pain, Mild (1-3)  


 


Atorvastatin Calcium  80 mg  11/22/19 22:00  11/26/19 21:41





  Lipitor  PO   80 mg





  QHS OCTAVIANO   Administration


 


Bisacodyl  10 mg  11/21/19 22:07 





  Dulcolax  IN  





  QDAY PRN  





  Constipation  


 


Clopidogrel Bisulfate  75 mg  11/26/19 10:00  11/27/19 09:49





  Plavix  PO   75 mg





  QDAY OCTAVIANO   Administration


 


Gabapentin  300 mg  11/23/19 23:30  11/27/19 13:50





  Gabapentin  PO   300 mg





  TID OCTAVIANO   Administration


 


Hydralazine HCl  5 mg  11/22/19 02:21  11/23/19 00:12





  Apresoline  IV   5 mg





  Q6H PRN   Administration





  Hypertension  


 


Hydromorphone HCl  0.5 mg  11/25/19 20:12  11/27/19 04:13





  Dilaudid  IV   0.5 mg





  Q3H PRN   Administration





  Pain , Severe (7-10)  


 


Potassium Chloride/Dextrose/Sod Cl  20 meq in 1,000 mls @ 42 mls/hr  11/25/19 

21:00  11/27/19 04:13





  D5w/0.45% Nacl/Kcl 20 Meq  IV   42 mls/hr





  AS DIRECT OCTAVIANO   Administration


 


Insulin Human Regular  0 units  11/24/19 07:30  11/27/19 17:09





  Humulin R  SUB-Q   Not Given





  ACHS OCTAVIANO  





  Protocol  


 


Magnesium Hydroxide  30 ml  11/21/19 22:07 





  Milk Of Magnesia  PO  





  Q4H PRN  





  Constipation  


 


Naloxone HCl  0.1 mg  11/25/19 20:12 





  Naloxone  IV  





  Q2MIN PRN  





  Res Rate </= 8 or 02 SAT < 92%  


 


Ondansetron HCl  4 mg  11/21/19 22:07  11/22/19 01:25





  Zofran  IV   4 mg





  Q8H PRN   Administration





  Nausea And Vomiting  


 


Oxycodone/Acetaminophen  1 tab  11/25/19 20:12  11/27/19 13:50





  Percocet 5/325  PO   1 tab





  Q6H PRN   Administration





  Pain, Moderate (4-6)  


 


Pantoprazole Sodium  40 mg  11/25/19 10:00  11/27/19 09:49





  Protonix  PO   40 mg





  DAILY OCTAVIANO   Administration


 


Sodium Chloride  10 ml  11/21/19 22:07  11/26/19 05:30





  Sodium Chloride Flush Syringe 10 Ml  IV   10 ml





  PRN PRN   Administration





  LINE FLUSH

## 2019-11-27 NOTE — DISCHARGE SUMMARY
Providers





- Providers


Date of Admission: 


11/21/19 22:06





Attending physician: 


MAGNO TREJO MD





                                        





11/21/19


Consult to Physician [CONS] Routine 


   Comment: 


   Consulting Provider: GURPREET ESPAÑA


   Physician Instructions: 


   Reason For Exam: cva





11/21/19 22:07


Consult to Case Management [CONS] Routine 


   Services Needed at Discharge: Home Health Services


   Notified:: 


Occupational Therapy Evaluate and Treat [CONS] Routine 


   Comment: 


   Reason For Exam: Neuro deficits


Physical Therapy Evaluation and Treat [CONS] Routine 


   Comment: 


   Reason For Exam: Neuro deficits





11/22/19 02:03


Consult to Physician [CONS] Routine 


   Comment: 


   Consulting Provider: RAEANN GONG


   Physician Instructions: 


   Reason For Exam: ugib





11/22/19 02:21


Consult to Physician [CONS] Routine 


   Comment: 


   Consulting Provider: JOSEY HART


   Physician Instructions: 


   Reason For Exam: carotid stenosis





11/22/19 18:12


Speech Therapy Evaluation and Treat [CONS] Routine 


   Reason For Exam: cva





11/24/19 14:29


Consult to Case Management [CONS] Routine 


   Services Needed at Discharge: Home Health Services


   Notified:: 


Physical Therapy Evaluation and Treat [CONS] Routine 


   Comment: does pateint need SNIF


   Reason For Exam: cva





11/26/19 08:23


Consult to Physician [CONS] Routine 


   Comment: 


   Consulting Provider: ASAD TOVAR


   Physician Instructions: 


   Reason For Exam: CVA











Primary care physician: 


PRIMARY CARE MD








Hospitalization


Condition: Stable


Hospital course: 





madhavi is a 73 yo  man with a history of CHF, coronary artery disease, 

diabetes, carotid stenosis, COPD and prior CVA  who presented with aphasia and 

facial droop. He received tpa and now able to mumble a couple of words. Patient 

has a history of carotid stenosis, refused surgery in the past per chart. 





* CTA head and neck reviewed


* Left Carotid Endarterectomy and Patch Angioplasty with Bovine Pericardium with

   vascular surgeon


* Passed swallow eval this am


* MRI consistent with Acute Ischemic changes in the left frontal and parietal 

  cortex with no evidence of hemorrhage





Assessment


Acute CVA, With Left Hemiplegia AND Dysathria: status post TPA: Neurology input 

noted, stroke protocol, PT/OT eval


Carotid stenosis: Vascular surgeon consulted, input noted-S/P Left Carotid 

Endarterectomy and Patch Angioplasty with Bovine Pericardium


CHF, stable


coronary artery disease: Continue statin and antiplatete


HTN


diabetes: Accu check/Ac/hs


COPD


Coffee ground emesis- Evaluated by GI, stable, PPI recommended, no plan for 

scope at this time


 


Disposition: DC/[facility]


Time spent for discharge: 35 mins





Core Measure Documentation





- Palliative Care


Palliative Care/ Comfort Measures: Not Applicable





Exam





- Constitutional


Vitals: 


                                        











Temp Pulse Resp BP Pulse Ox


 


 98.3 F   74   18   135/57   98 


 


 11/27/19 07:28  11/27/19 08:00  11/27/19 10:00  11/27/19 07:28  11/27/19 10:00














Plan


Activity: advance as tolerated, fall precautions


Diet: low fat


Special Instructions: record daily BP diary, physical therapy, occupational 

therapy


Follow up with: 


PRIMARY CARE,MD [Primary Care Provider] - 7 Days


DOROTEO LO MD [Staff Physician] - 7 Days


RIP LACKEY MD [Staff Physician] - 7 Days


MAGNO HAMMER MD [Staff Physician] - 7 Days


Prescriptions: 


AtorvaSTATin [Lipitor] 80 mg PO QHS #30 tablet


Clopidogrel [Plavix] 75 mg PO QDAY #30 tablet

## 2019-11-28 RX ADMIN — GABAPENTIN SCH MG: 300 CAPSULE ORAL at 14:13

## 2019-11-28 RX ADMIN — OXYCODONE AND ACETAMINOPHEN PRN TAB: 5; 325 TABLET ORAL at 04:46

## 2019-11-28 RX ADMIN — INSULIN HUMAN SCH: 100 INJECTION, SOLUTION PARENTERAL at 21:51

## 2019-11-28 RX ADMIN — PANTOPRAZOLE SODIUM SCH MG: 40 TABLET, DELAYED RELEASE ORAL at 09:32

## 2019-11-28 RX ADMIN — INSULIN HUMAN SCH: 100 INJECTION, SOLUTION PARENTERAL at 09:32

## 2019-11-28 RX ADMIN — OXYCODONE AND ACETAMINOPHEN PRN TAB: 5; 325 TABLET ORAL at 14:14

## 2019-11-28 RX ADMIN — GABAPENTIN SCH MG: 300 CAPSULE ORAL at 21:52

## 2019-11-28 RX ADMIN — CLOPIDOGREL BISULFATE SCH MG: 75 TABLET ORAL at 09:32

## 2019-11-28 RX ADMIN — INSULIN HUMAN SCH: 100 INJECTION, SOLUTION PARENTERAL at 12:00

## 2019-11-28 RX ADMIN — INSULIN HUMAN SCH UNITS: 100 INJECTION, SOLUTION PARENTERAL at 18:27

## 2019-11-28 RX ADMIN — GABAPENTIN SCH MG: 300 CAPSULE ORAL at 09:32

## 2019-11-28 NOTE — PROGRESS NOTE
Assessment and Plan


Assessment and plan: 


Patient is a 75 yo  man with a history of CHF, coronary artery disease,

diabetes, carotid stenosis, COPD and prior CVA  who presented with aphasia and 

facial droop. He received tpa and now able to mumble a couple of words. Patient 

has a history of carotid stenosis, refused surgery in the past per chart. 





* CTA head and neck reviewed


* Left Carotid Endarterectomy and Patch Angioplasty with Bovine Pericardium with

  vascular surgeon


* Passed swallow eval this am


* MRI consistent with Acute Ischemic changes in the left frontal and parietal 

  cortex with no evidence of hemorrhage





Assessment


Acute CVA, With Left Hemiplegia AND Dysathria: status post TPA: Neurology input 

noted, stroke protocol, PT/OT eval


Carotid stenosis: Vascular surgeon consulted, input noted-S/P Left Carotid 

Endarterectomy and Patch Angioplasty with Bovine Pericardium


CHF, stable


Coronary artery disease: Continue statin and antiplatetet


HTN:controled


Diabetes: Accu check/Ac/hs


COPD: stable


Coffee ground emesis- Evaluated by GI, stable, PPI recommended, no plan for 

scope at this time


DVT/GI prophy


PENDING PLACEMENT








History


Interval history: 


Patient seen and examined, remains with dysathria which is not new. Patient is 

clinically stable following the CEA. No new complaints today patient is pending 

placement to rehab discharge planning was done for today but still awaiting


Tolerating diet. No new complaint








Hospitalist Physical





- Physical exam


Narrative exam: 


General appearance: Present: no acute distress





- EENT


Eyes: Present: PERRL, EOM intact


ENT: hearing intact, clear oral mucosa, dentition normal





- Neck


Neck: Present: supple, normal ROM





- Respiratory


Respiratory effort: normal


Respiratory: bilateral: CTA





- Cardiovascular


Rhythm: regular





- Extremities


Extremities: pulses intact


Peripheral Pulses: within normal limits





- Abdominal


General gastrointestinal: soft, non-tender, non-distended





- Integumentary


Integumentary: Present: clear, warm, dry





- Neurologic


Neurologic: focal deficits, dysarthria 








- Constitutional


Vitals: 


                                        











Temp Pulse Resp BP Pulse Ox


 


 98.1 F   62   20   114/39   96 


 


 11/28/19 05:20  11/28/19 09:06  11/28/19 04:53  11/28/19 09:06  11/28/19 04:53











General appearance: Present: no acute distress





Results





- Labs


CBC & Chem 7: 


                                 11/24/19 09:05





                                 11/25/19 12:00


Labs: 


                             Laboratory Last Values











WBC  7.6 K/mm3 (4.5-11.0)   11/24/19  09:05    


 


RBC  4.79 M/mm3 (3.65-5.03)   11/24/19  09:05    


 


Hgb  15.3 gm/dl (11.8-15.2)  H  11/24/19  09:05    


 


Hct  44.7 % (35.5-45.6)   11/24/19  09:05    


 


MCV  93 fl (84-94)   11/24/19  09:05    


 


MCH  32 pg (28-32)   11/24/19  09:05    


 


MCHC  34 % (32-34)   11/24/19  09:05    


 


RDW  14.8 % (13.2-15.2)   11/24/19  09:05    


 


Plt Count  179 K/mm3 (140-440)   11/24/19  09:05    


 


Lymph % (Auto)  24.6 % (13.4-35.0)   11/24/19  09:05    


 


Mono % (Auto)  8.7 % (0.0-7.3)  H  11/24/19  09:05    


 


Eos % (Auto)  3.5 % (0.0-4.3)   11/24/19  09:05    


 


Baso % (Auto)  0.3 % (0.0-1.8)   11/24/19  09:05    


 


Lymph #  1.9 K/mm3 (1.2-5.4)   11/24/19  09:05    


 


Mono #  0.7 K/mm3 (0.0-0.8)   11/24/19  09:05    


 


Eos #  0.3 K/mm3 (0.0-0.4)   11/24/19  09:05    


 


Baso #  0.0 K/mm3 (0.0-0.1)   11/24/19  09:05    


 


Seg Neutrophils %  62.9 % (40.0-70.0)   11/24/19  09:05    


 


Seg Neutrophils #  4.7 K/mm3 (1.8-7.7)   11/24/19  09:05    


 


PT  18.3 Sec. (12.2-14.9)  H  11/21/19  19:40    


 


INR  1.54  (0.87-1.13)  H  11/21/19  19:40    


 


APTT  47.9 Sec. (24.2-36.6)  H  11/21/19  19:40    


 


Thrombin Time  43.9 Sec. (15.1-19.6)  H  11/21/19  19:40    


 


Activated Clotting Time  153  ()  H  11/25/19  16:58    


 


Sodium  135 mmol/L (137-145)  L  11/25/19  12:00    


 


Potassium  4.0 mmol/L (3.6-5.0)   11/25/19  12:00    


 


Chloride  98.3 mmol/L ()   11/25/19  12:00    


 


Carbon Dioxide  21 mmol/L (22-30)  L  11/25/19  12:00    


 


Anion Gap  20 mmol/L  11/25/19  12:00    


 


BUN  17 mg/dL (9-20)   11/25/19  12:00    


 


Creatinine  0.7 mg/dL (0.8-1.5)  L  11/25/19  12:00    


 


Estimated GFR  > 60 ml/min  11/25/19  12:00    


 


BUN/Creatinine Ratio  24 %  11/25/19  12:00    


 


Glucose  120 mg/dL ()  H  11/25/19  12:00    


 


POC Glucose  115  ()  H  11/28/19  09:00    


 


Calcium  9.2 mg/dL (8.4-10.2)   11/25/19  12:00    


 


Total Bilirubin  0.40 mg/dL (0.1-1.2)   11/25/19  12:00    


 


Direct Bilirubin  < 0.2 mg/dL (0-0.2)   11/21/19  19:40    


 


AST  14 units/L (5-40)   11/25/19  12:00    


 


ALT  < 5 units/L (7-56)  L  11/25/19  12:00    


 


Alkaline Phosphatase  89 units/L ()   11/25/19  12:00    


 


Troponin T  < 0.010 ng/mL (0.00-0.029)   11/21/19  19:40    


 


Total Protein  7.0 g/dL (6.3-8.2)   11/25/19  12:00    


 


Albumin  3.7 g/dL (3.9-5)  L  11/25/19  12:00    


 


Albumin/Globulin Ratio  1.1 %  11/25/19  12:00    


 


Triglycerides  89 mg/dL (2-149)   11/22/19  03:59    


 


Cholesterol  153 mg/dL ()   11/22/19  03:59    


 


LDL Cholesterol Direct  108 mg/dL ()   11/22/19  03:59    


 


HDL Cholesterol  34 mg/dL (40-59)  L  11/22/19  03:59    


 


Cholesterol/HDL Ratio  4.50 %  11/22/19  03:59    


 


TSH  0.943 mlU/mL (0.270-4.200)   11/21/19  19:40    


 


Blood Type  O POSITIVE   11/25/19  07:58    


 


Antibody Screen  Negative   11/25/19  07:58    














Active Medications





- Current Medications


Current Medications: 














Generic Name Dose Route Start Last Admin





  Trade Name Freq  PRN Reason Stop Dose Admin


 


Acetaminophen  650 mg  11/21/19 22:07  11/24/19 10:45





  Tylenol  PO   650 mg





  Q4H PRN   Administration





  Pain, Mild (1-3)  


 


Atorvastatin Calcium  80 mg  11/22/19 22:00  11/27/19 21:39





  Lipitor  PO   80 mg





  QHS OCTAVIANO   Administration


 


Bisacodyl  10 mg  11/21/19 22:07 





  Dulcolax  [place]  





  QDAY PRN  





  Constipation  


 


Clopidogrel Bisulfate  75 mg  11/26/19 10:00  11/28/19 09:32





  Plavix  PO   75 mg





  QDAY OCTAVIANO   Administration


 


Gabapentin  300 mg  11/23/19 23:30  11/28/19 09:32





  Gabapentin  PO   300 mg





  TID OCTAVIANO   Administration


 


Hydralazine HCl  5 mg  11/22/19 02:21  11/23/19 00:12





  Apresoline  IV   5 mg





  Q6H PRN   Administration





  Hypertension  


 


Hydromorphone HCl  0.5 mg  11/25/19 20:12  11/27/19 04:13





  Dilaudid  IV   0.5 mg





  Q3H PRN   Administration





  Pain , Severe (7-10)  


 


Insulin Human Regular  0 units  11/24/19 07:30  11/28/19 09:32





  Humulin R  SUB-Q   Not Given





  ACHS [facility]  





  Protocol  


 


Magnesium Hydroxide  30 ml  11/21/19 22:07 





  Milk Of Magnesia  PO  





  Q4H PRN  





  Constipation  


 


Naloxone HCl  0.1 mg  11/25/19 20:12 





  Naloxone  IV  





  Q2MIN PRN  





  Res Rate </= 8 or 02 SAT < 92%  


 


Ondansetron HCl  4 mg  11/21/19 22:07  11/22/19 01:25





  Zofran  IV   4 mg





  Q8H PRN   Administration





  Nausea And Vomiting  


 


Oxycodone/Acetaminophen  1 tab  11/25/19 20:12  11/28/19 04:46





  Percocet 5/325  PO   1 tab





  Q6H PRN   Administration





  Pain, Moderate (4-6)  


 


Pantoprazole Sodium  40 mg  11/25/19 10:00  11/28/19 09:32





  Protonix  PO   40 mg





  DAILY OCTAVIANO   Administration


 


Sodium Chloride  10 ml  11/21/19 22:07  11/26/19 05:30





  Sodium Chloride Flush Syringe 10 Ml  IV   10 ml





  PRN PRN   Administration





  LINE FLUSH

## 2019-11-29 RX ADMIN — INSULIN HUMAN SCH: 100 INJECTION, SOLUTION PARENTERAL at 21:16

## 2019-11-29 RX ADMIN — INSULIN HUMAN SCH: 100 INJECTION, SOLUTION PARENTERAL at 08:24

## 2019-11-29 RX ADMIN — PANTOPRAZOLE SODIUM SCH MG: 40 TABLET, DELAYED RELEASE ORAL at 10:23

## 2019-11-29 RX ADMIN — OXYCODONE AND ACETAMINOPHEN PRN TAB: 5; 325 TABLET ORAL at 03:16

## 2019-11-29 RX ADMIN — CLOPIDOGREL BISULFATE SCH MG: 75 TABLET ORAL at 10:22

## 2019-11-29 RX ADMIN — GABAPENTIN SCH MG: 300 CAPSULE ORAL at 10:22

## 2019-11-29 RX ADMIN — INSULIN HUMAN SCH: 100 INJECTION, SOLUTION PARENTERAL at 12:24

## 2019-11-29 RX ADMIN — GABAPENTIN SCH MG: 300 CAPSULE ORAL at 21:16

## 2019-11-29 RX ADMIN — GABAPENTIN SCH MG: 300 CAPSULE ORAL at 14:55

## 2019-11-29 RX ADMIN — INSULIN HUMAN SCH: 100 INJECTION, SOLUTION PARENTERAL at 18:46

## 2019-11-29 NOTE — PROGRESS NOTE
Assessment and Plan





Patient doing well from a surgical standpoint.  He will need reconditioning with

physical therapy and may require short-term stay in acute rehabilitation.  The 

patient lives with his wife and daughter.  He ambulates with a walker at home.





Subjective


Date of service: 11/29/19


Principal diagnosis: left carotid stenosis, left CVA


Interval history: 





Patient is status post left carotid endarterectomy.  His incision site is soft 

with only minimal subcutaneous bruising.  Patient is having no difficulty 

breathing or swallowing food.  Overall, the patient feels weak with decreased 

strength on the right side.  Was working with physical therapy prior to surgery.





Objective





- Constitutional


Vitals: 


                               Vital Signs - 12hr











  11/29/19 11/29/19 11/29/19





  03:16 03:31 04:00


 


Temperature  98.3 F 


 


Pulse Rate  71 71


 


Respiratory 18 18 





Rate   


 


Blood Pressure  138/67 


 


O2 Sat by Pulse  98 





Oximetry   














  11/29/19 11/29/19





  08:22 10:17


 


Temperature 97.0 F L 


 


Pulse Rate 65 


 


Respiratory 18 





Rate  


 


Blood Pressure 128/51 


 


O2 Sat by Pulse 97 97





Oximetry  











General appearance: Present: no acute distress





- EENT


Eyes: EOM intact


ENT: hearing intact





- Neck


Neck: supple, normal ROM, other (incision C/D/I)





- Respiratory


Respiratory effort: normal


Extremities: no ischemia





- Gastrointestinal


General gastrointestinal: Present: deferred


Rectal Exam: deferred





- Genitourinary


Male genitourinary: deferred





- Musculoskeletal


Musculoskeletal: right sided weakness





- Psychiatric


Psychiatric: appropriate mood/affect, cooperative





- Labs


CBC & Chem 7: 


                                 11/24/19 09:05





                                 11/25/19 12:00


Labs: 


                              Abnormal lab results











  11/28/19 11/28/19 11/29/19 Range/Units





  12:15 17:24 08:32 


 


POC Glucose  106 H  231 H  118 H  ()  














Medications & Allergies





- Medications


Allergies/Adverse Reactions: 


                                    Allergies





Penicillins Allergy (Verified 11/23/19 16:45)


   Rash








Home Medications: 


                                Home Medications











 Medication  Instructions  Recorded  Confirmed  Last Taken  Type


 


Gabapentin 300 mg PO TID 11/22/19 11/22/19 Unknown History


 


Losartan Potassium 25 mg PO DAILY 11/22/19 11/22/19 Unknown History


 


Pantoprazole 40 mg PO DAILY 11/22/19 11/22/19 Unknown History


 


metFORMIN 500 mg PO BID 11/22/19 11/22/19 Unknown History


 


AtorvaSTATin [Lipitor] 80 mg PO QHS #30 tablet 11/27/19  Unknown Rx


 


Clopidogrel [Plavix] 75 mg PO QDAY #30 tablet 11/27/19  Unknown Rx











Active Medications: 














Generic Name Dose Route Start Last Admin





  Trade Name Freq  PRN Reason Stop Dose Admin


 


Acetaminophen  650 mg  11/21/19 22:07  11/24/19 10:45





  Tylenol  PO   650 mg





  Q4H PRN   Administration





  Pain, Mild (1-3)  


 


Atorvastatin Calcium  80 mg  11/22/19 22:00  11/28/19 21:52





  Lipitor  PO   80 mg





  [facility]HS OCTAVIANO   Administration


 


Bisacodyl  10 mg  11/21/19 22:07 





  Dulcolax  MT  





  QDAY PRN  





  Constipation  


 


Clopidogrel Bisulfate  75 mg  11/26/19 10:00  11/29/19 10:22





  Plavix  PO   75 mg





  QDAY OCTAVIANO   Administration


 


Gabapentin  300 mg  11/23/19 23:30  11/29/19 10:22





  Gabapentin  PO   300 mg





  TID OCTAVIANO   Administration


 


Hydralazine HCl  5 mg  11/22/19 02:21  11/23/19 00:12





  Apresoline  IV   5 mg





  Q6H PRN   Administration





  Hypertension  


 


Hydromorphone HCl  0.5 mg  11/25/19 20:12  11/27/19 04:13





  Dilaudid  IV   0.5 mg





  Q3H PRN   Administration





  Pain , Severe (7-10)  


 


Insulin Human Regular  0 units  11/24/19 07:30  11/29/19 08:24





  Humulin R  SUB-Q   Not Given





  [facility]  





  Protocol  


 


Magnesium Hydroxide  30 ml  11/21/19 22:07 





  Milk Of Magnesia  PO  





  Q4H PRN  





  Constipation  


 


Naloxone HCl  0.1 mg  11/25/19 20:12 





  Naloxone  IV  





  Q2MIN PRN  





  Res Rate </= 8 or 02 SAT < 92%  


 


Ondansetron HCl  4 mg  11/21/19 22:07  11/22/19 01:25





  Zofran  IV   4 mg





  Q8H PRN   Administration





  Nausea And Vomiting  


 


Oxycodone/Acetaminophen  1 tab  11/25/19 20:12  11/29/19 03:16





  Percocet 5/325  PO   1 tab





  Q6H PRN   Administration





  Pain, Moderate (4-6)  


 


Pantoprazole Sodium  40 mg  11/25/19 10:00  11/29/19 10:23





  Protonix  PO   40 mg





  DAILY OCTAVIANO   Administration


 


Sodium Chloride  10 ml  11/21/19 22:07  11/26/19 05:30





  Sodium Chloride Flush Syringe 10 Ml  IV   10 ml





  PRN PRN   Administration





  LINE FLUSH

## 2019-11-29 NOTE — PROGRESS NOTE
Assessment and Plan


Assessment and plan: 


Patient is a 73 yo  man with a history of CHF, coronary artery disease,

diabetes, carotid stenosis, COPD and prior CVA  who presented with aphasia and 

facial droop. He received tpa and now able to mumble a couple of words. Patient 

has a history of carotid stenosis, refused surgery in the past per chart. 





* CTA head and neck reviewed


* Left Carotid Endarterectomy and Patch Angioplasty with Bovine Pericardium with

  vascular surgeon


* Passed swallow eval this am


* MRI consistent with Acute Ischemic changes in the left frontal and parietal 

  cortex with no evidence of hemorrhage





Assessment


Acute CVA, With Left Hemiplegia AND Dysathria: status post TPA: Neurology input 

noted, stroke protocol, PT/OT eval


Carotid stenosis: Vascular surgeon consulted, input noted-S/P Left Carotid 

Endarterectomy and Patch Angioplasty with Bovine Pericardium


CHF, stable


Coronary artery disease: Continue statin and antiplatetet


HTN:controled


Diabetes: Accu check/Ac/hs


COPD: stable


Coffee ground emesis- Evaluated by GI, stable, PPI recommended, no plan for 

scope at this time


DVT/GI prophy


PENDING PLACEMENT








History


Interval history: 


Patient seen and examined, remains with dysathria which is not new. Patient is 

clinically stable following the CEA. No new complaints today patient is pending 

placement to rehab discharge planning was done for today but still awaiting ins

urance approval


Tolerating diet. No new complaint








Hospitalist Physical





- Physical exam


Narrative exam: 


General appearance: Present: no acute distress





- EENT


Eyes: Present: PERRL, EOM intact


ENT: hearing intact, clear oral mucosa, dentition normal





- Neck


Neck: Present: supple, normal ROM





- Respiratory


Respiratory effort: normal


Respiratory: bilateral: CTA





- Cardiovascular


Rhythm: regular





- Extremities


Extremities: pulses intact


Peripheral Pulses: within normal limits





- Abdominal


General gastrointestinal: soft, non-tender, non-distended





- Integumentary


Integumentary: Present: clear, warm, dry





- Neurologic


Neurologic: focal deficits, dysarthria 








- Constitutional


Vitals: 


                                        











Temp Pulse Resp BP Pulse Ox


 


 97.0 F L  65   18   128/51   97 


 


 11/29/19 08:22  11/29/19 08:22  11/29/19 08:22  11/29/19 08:22  11/29/19 10:17











General appearance: Present: no acute distress





Results





- Labs


CBC & Chem 7: 


                                 11/24/19 09:05





                                 11/25/19 12:00


Labs: 


                             Laboratory Last Values











WBC  7.6 K/mm3 (4.5-11.0)   11/24/19  09:05    


 


RBC  4.79 M/mm3 (3.65-5.03)   11/24/19  09:05    


 


Hgb  15.3 gm/dl (11.8-15.2)  H  11/24/19  09:05    


 


Hct  44.7 % (35.5-45.6)   11/24/19  09:05    


 


MCV  93 fl (84-94)   11/24/19  09:05    


 


MCH  32 pg (28-32)   11/24/19  09:05    


 


MCHC  34 % (32-34)   11/24/19  09:05    


 


RDW  14.8 % (13.2-15.2)   11/24/19  09:05    


 


Plt Count  179 K/mm3 (140-440)   11/24/19  09:05    


 


Lymph % (Auto)  24.6 % (13.4-35.0)   11/24/19  09:05    


 


Mono % (Auto)  8.7 % (0.0-7.3)  H  11/24/19  09:05    


 


Eos % (Auto)  3.5 % (0.0-4.3)   11/24/19  09:05    


 


Baso % (Auto)  0.3 % (0.0-1.8)   11/24/19  09:05    


 


Lymph #  1.9 K/mm3 (1.2-5.4)   11/24/19  09:05    


 


Mono #  0.7 K/mm3 (0.0-0.8)   11/24/19  09:05    


 


Eos #  0.3 K/mm3 (0.0-0.4)   11/24/19  09:05    


 


Baso #  0.0 K/mm3 (0.0-0.1)   11/24/19  09:05    


 


Seg Neutrophils %  62.9 % (40.0-70.0)   11/24/19  09:05    


 


Seg Neutrophils #  4.7 K/mm3 (1.8-7.7)   11/24/19  09:05    


 


PT  18.3 Sec. (12.2-14.9)  H  11/21/19  19:40    


 


INR  1.54  (0.87-1.13)  H  11/21/19  19:40    


 


APTT  47.9 Sec. (24.2-36.6)  H  11/21/19  19:40    


 


Thrombin Time  43.9 Sec. (15.1-19.6)  H  11/21/19  19:40    


 


Activated Clotting Time  153  ()  H  11/25/19  16:58    


 


Sodium  135 mmol/L (137-145)  L  11/25/19  12:00    


 


Potassium  4.0 mmol/L (3.6-5.0)   11/25/19  12:00    


 


Chloride  98.3 mmol/L ()   11/25/19  12:00    


 


Carbon Dioxide  21 mmol/L (22-30)  L  11/25/19  12:00    


 


Anion Gap  20 mmol/L  11/25/19  12:00    


 


BUN  17 mg/dL (9-20)   11/25/19  12:00    


 


Creatinine  0.7 mg/dL (0.8-1.5)  L  11/25/19  12:00    


 


Estimated GFR  > 60 ml/min  11/25/19  12:00    


 


BUN/Creatinine Ratio  24 %  11/25/19  12:00    


 


Glucose  120 mg/dL ()  H  11/25/19  12:00    


 


POC Glucose  142  ()  H  11/29/19  16:25    


 


Calcium  9.2 mg/dL (8.4-10.2)   11/25/19  12:00    


 


Total Bilirubin  0.40 mg/dL (0.1-1.2)   11/25/19  12:00    


 


Direct Bilirubin  < 0.2 mg/dL (0-0.2)   11/21/19  19:40    


 


AST  14 units/L (5-40)   11/25/19  12:00    


 


ALT  < 5 units/L (7-56)  L  11/25/19  12:00    


 


Alkaline Phosphatase  89 units/L ()   11/25/19  12:00    


 


Troponin T  < 0.010 ng/mL (0.00-0.029)   11/21/19  19:40    


 


Total Protein  7.0 g/dL (6.3-8.2)   11/25/19  12:00    


 


Albumin  3.7 g/dL (3.9-5)  L  11/25/19  12:00    


 


Albumin/Globulin Ratio  1.1 %  11/25/19  12:00    


 


Triglycerides  89 mg/dL (2-149)   11/22/19  03:59    


 


Cholesterol  153 mg/dL ()   11/22/19  03:59    


 


LDL Cholesterol Direct  108 mg/dL ()   11/22/19  03:59    


 


HDL Cholesterol  34 mg/dL (40-59)  L  11/22/19  03:59    


 


Cholesterol/HDL Ratio  4.50 %  11/22/19  03:59    


 


TSH  0.943 mlU/mL (0.270-4.200)   11/21/19  19:40    


 


Blood Type  O POSITIVE   11/25/19  07:58    


 


Antibody Screen  Negative   11/25/19  07:58    














Active Medications





- Current Medications


Current Medications: 














Generic Name Dose Route Start Last Admin





  Trade Name Freq  PRN Reason Stop Dose Admin


 


Acetaminophen  650 mg  11/21/19 22:07  11/24/19 10:45





  Tylenol  PO   650 mg





  Q4H PRN   Administration





  Pain, Mild (1-3)  


 


Atorvastatin Calcium  80 mg  11/22/19 22:00  11/28/19 21:52





  Lipitor  PO   80 mg





  QHS OCTAVIANO   Administration


 


Bisacodyl  10 mg  11/21/19 22:07 





  Dulcolax  TX  





  QDAY PRN  





  Constipation  


 


Clopidogrel Bisulfate  75 mg  11/26/19 10:00  11/29/19 10:22





  Plavix  PO   75 mg





  QDAY OCTAVIANO   Administration


 


Gabapentin  300 mg  11/23/19 23:30  11/29/19 10:22





  Gabapentin  PO   300 mg





  TID OCTAVIANO   Administration


 


Hydralazine HCl  5 mg  11/22/19 02:21  11/23/19 00:12





  Apresoline  IV   5 mg





  Q6H PRN   Administration





  Hypertension  


 


Hydromorphone HCl  0.5 mg  11/25/19 20:12  11/27/19 04:13





  Dilaudid  IV   0.5 mg





  Q3H PRN   Administration





  Pain , Severe (7-10)  


 


Insulin Human Regular  0 units  11/24/19 07:30  11/29/19 12:24





  Humulin R  SUB-Q   Not Given





  [facility]  





  Protocol  


 


Magnesium Hydroxide  30 ml  11/21/19 22:07 





  Milk Of Magnesia  PO  





  Q4H PRN  





  Constipation  


 


Naloxone HCl  0.1 mg  11/25/19 20:12 





  Naloxone  IV  





  Q2MIN PRN  





  Res Rate </= 8 or 02 SAT < 92%  


 


Ondansetron HCl  4 mg  11/21/19 22:07  11/22/19 01:25





  Zofran  IV   4 mg





  Q8H PRN   Administration





  Nausea And Vomiting  


 


Oxycodone/Acetaminophen  1 tab  11/25/19 20:12  11/29/19 03:16





  Percocet 5/325  PO   1 tab





  Q6H PRN   Administration





  Pain, Moderate (4-6)  


 


Pantoprazole Sodium  40 mg  11/25/19 10:00  11/29/19 10:23





  Protonix  PO   40 mg





  DAILY OCTAVIANO   Administration


 


Sodium Chloride  10 ml  11/21/19 22:07  11/26/19 05:30





  Sodium Chloride Flush Syringe 10 Ml  IV   10 ml





  PRN PRN   Administration





  LINE FLUSH  














Nutrition/Malnutrition Assess





- Dietary Evaluation


Nutrition/Malnutrition Findings: 


                                        





Nutrition Notes                                            Start:  11/28/19 

13:50


Freq:                                                      Status: Active       




Protocol:                                                                       




 Document     11/28/19 13:50  LM  (Rec: 11/28/19 14:06  LM  [id number])


 Nutrition Notes


     Need for Assessment generated from:         LOS


     Initial or Follow up                        Assessment


     Current Diagnosis                           COPD,Diabetes,Hypertension,


                                                 Heart Failure,Stroke


     Other Pertinent Diagnosis                   carotid stenosis


     Current Diet                                Cardiac


     Labs/Tests                                  POC glu 115


     Pertinent Medications                       Reviewed


     Height                                      5 ft 5 in


     Weight                                      76.6 kg


     Usual Body Weight                           113.6 kg


     Ideal Body Weight (kg)                      61.81


     BMI                                         28.0


     Weight change and time frame                32.7% wt loss in 1.5 years


     Weight Status                               Overweight


     Subjective/Other Information                Screen for LOS. Pt stated he


                                                 has had a decreased appetite


                                                 for 4 days (since heart


                                                 surgury on 11/24). Pt family


                                                 brought in Thanksgiving food


                                                 for pt and pt ate 0%. Pt


                                                 stated he has lost 100 lb in 1


                                                 .5 years. Pt's family memeber


                                                 stated wt loss and health


                                                 declined since long term.


                                                 Discussed trying ONS with pt.


                                                 Pt agreed to try.


     Burn                                        Absent


     Trauma                                      Absent


     GI Symptoms                                 None


     Current % PO                                Negligible


     Minimum of two criteria                     Yes


     Energy Intake (severe)                      < or equal to 50% Estimated


                                                 Energy Requirement > or equal


                                                 to 5 days


     Interpretation of Weight Loss (severe)      > 20% in 1 year


     #1


      Nutrition Diagnosis                        Malnutrition


      Etiology                                   Chronic illness


      As Evidenced by Signs and Symptoms         32.7% wt loss in 1.5 years, <


                                                 50% EER >5 day


     Is patient on ventilator?                   No


     Is Patient Ambulatory and/or Out of Bed     No


     REE-([facility]-confined to bed)      [phone]


     Calculation Used for Recommendations        Isa Palma


     Additional Notes                            Protein needs: 92-117g (1.2-1.


                                                 5g/kg)


                                                 Fluid: 1 ml/kcal of per MD


 Nutrition Intervention


     Change Diet Order:                          Continue Cardiac


     Add Supplement/Snack (indicate name/kcal    Glucerna chocolate once a day


      /protein )                                 


     Provides kCal:                              220


     Provides Protein (gm)                       10


     Goal #1                                     Meet at least 80% of energy


                                                 and preotein needs


     Anticipated Discharge Needs:                Cardiac/consisntent CHO diet


     Follow-Up By:                               12/02/19


     Additional Comments                         F/U for PO/ONS intakes, ONS


                                                 tolerance

## 2019-11-30 RX ADMIN — CLOPIDOGREL BISULFATE SCH MG: 75 TABLET ORAL at 09:11

## 2019-11-30 RX ADMIN — INSULIN HUMAN SCH: 100 INJECTION, SOLUTION PARENTERAL at 08:57

## 2019-11-30 RX ADMIN — OXYCODONE AND ACETAMINOPHEN PRN TAB: 5; 325 TABLET ORAL at 10:56

## 2019-11-30 RX ADMIN — INSULIN HUMAN SCH: 100 INJECTION, SOLUTION PARENTERAL at 19:27

## 2019-11-30 RX ADMIN — PANTOPRAZOLE SODIUM SCH MG: 40 TABLET, DELAYED RELEASE ORAL at 09:11

## 2019-11-30 RX ADMIN — GABAPENTIN SCH MG: 300 CAPSULE ORAL at 09:11

## 2019-11-30 RX ADMIN — OXYCODONE AND ACETAMINOPHEN PRN TAB: 5; 325 TABLET ORAL at 22:50

## 2019-11-30 RX ADMIN — GABAPENTIN SCH MG: 300 CAPSULE ORAL at 22:47

## 2019-11-30 RX ADMIN — GABAPENTIN SCH MG: 300 CAPSULE ORAL at 13:14

## 2019-11-30 RX ADMIN — INSULIN HUMAN SCH UNITS: 100 INJECTION, SOLUTION PARENTERAL at 22:46

## 2019-11-30 RX ADMIN — INSULIN HUMAN SCH UNITS: 100 INJECTION, SOLUTION PARENTERAL at 13:14

## 2019-11-30 NOTE — PROGRESS NOTE
Assessment and Plan





Patient's dysarthria is improving somewhat.  He'll need stay in rehabilitation 

facility and is awaiting transfer at this point.  He is okay to be discharged to

rehabilitation facility from a vascular standpoint.  He will need a follow-up in

our office 2 weeks following his discharge.





Subjective


Date of service: 11/30/19


Principal diagnosis: left carotid stenosis, left CVA


Interval history: 





Patient doing well from a surgical standpoint.  Still complaining of some 

incisional pain.  Well-controlled with oral pain medication.  No significant swe

lling.  Minimal subcutaneous hematoma.  Patient complains overall of being 

"weak"





Objective





- Constitutional


Vitals: 


                               Vital Signs - 12hr











  11/29/19 11/30/19 11/30/19





  23:42 04:03 04:06


 


Temperature 98.3 F 98.3 F 


 


Pulse Rate 53 L 72 73


 


Respiratory 20 20 





Rate   


 


Blood Pressure 118/42 140/57 


 


O2 Sat by Pulse 96 91 





Oximetry   














  11/30/19 11/30/19 11/30/19





  08:02 08:14 09:09


 


Temperature  97.8 F 


 


Pulse Rate  69 


 


Respiratory 18 18 





Rate   


 


Blood Pressure  143/48 


 


O2 Sat by Pulse 98 96 96





Oximetry   











General appearance: Present: mild distress





- EENT


Eyes: EOM intact


ENT: hearing intact





- Neck


Neck: supple, other (left CEA incision clean dry and intact)





- Respiratory


Respiratory effort: normal





- Gastrointestinal


General gastrointestinal: Present: deferred


Rectal Exam: deferred





- Genitourinary


Male genitourinary: deferred





- Psychiatric


Psychiatric: appropriate mood/affect, cooperative





- Labs


CBC & Chem 7: 


                                 11/24/19 09:05





                                 11/25/19 12:00


Labs: 


                              Abnormal lab results











  11/29/19 11/29/19 11/30/19 Range/Units





  16:25 21:10 08:25 


 


POC Glucose  142 H  127 H  123 H  ()  














Medications & Allergies





- Medications


Allergies/Adverse Reactions: 


                                    Allergies





Penicillins Allergy (Verified 11/23/19 16:45)


   Rash








Home Medications: 


                                Home Medications











 Medication  Instructions  Recorded  Confirmed  Last Taken  Type


 


Gabapentin 300 mg PO TID 11/22/19 11/22/19 Unknown History


 


Losartan Potassium 25 mg PO DAILY 11/22/19 11/22/19 Unknown History


 


Pantoprazole 40 mg PO DAILY 11/22/19 11/22/19 Unknown History


 


metFORMIN 500 mg PO BID 11/22/19 11/22/19 Unknown History


 


AtorvaSTATin [Lipitor] 80 mg PO QHS #30 tablet 11/27/19  Unknown Rx


 


Clopidogrel [Plavix] 75 mg PO QDAY #30 tablet 11/27/19  Unknown Rx











Active Medications: 














Generic Name Dose Route Start Last Admin





  Trade Name Freq  PRN Reason Stop Dose Admin


 


Acetaminophen  650 mg  11/21/19 22:07  11/24/19 10:45





  Tylenol  PO   650 mg





  Q4H PRN   Administration





  Pain, Mild (1-3)  


 


Atorvastatin Calcium  80 mg  11/22/19 22:00  11/29/19 21:16





  Lipitor  PO   80 mg





  QHS OCTAVIANO   Administration


 


Bisacodyl  10 mg  11/21/19 22:07 





  Dulcolax  OH  





  QDAY PRN  





  Constipation  


 


Clopidogrel Bisulfate  75 mg  11/26/19 10:00  11/30/19 09:11





  Plavix  PO   75 mg





  QDAY OCTAVIANO   Administration


 


Gabapentin  300 mg  11/23/19 23:30  11/30/19 09:11





  Gabapentin  PO   300 mg





  TID OCTAVIANO   Administration


 


Hydralazine HCl  5 mg  11/22/19 02:21  11/23/19 00:12





  Apresoline  IV   5 mg





  Q6H PRN   Administration





  Hypertension  


 


Hydromorphone HCl  0.5 mg  11/25/19 20:12  11/27/19 04:13





  Dilaudid  IV   0.5 mg





  Q3H PRN   Administration





  Pain , Severe (7-10)  


 


Insulin Human Regular  0 units  11/24/19 07:30  11/30/19 08:57





  Humulin R  SUB-Q   Not Given





  [facility]  





  Protocol  


 


Magnesium Hydroxide  30 ml  11/21/19 22:07 





  Milk Of Magnesia  PO  





  Q4H PRN  





  Constipation  


 


Naloxone HCl  0.1 mg  11/25/19 20:12 





  Naloxone  IV  





  Q2MIN PRN  





  Res Rate </= 8 or 02 SAT < 92%  


 


Ondansetron HCl  4 mg  11/21/19 22:07  11/22/19 01:25





  Zofran  IV   4 mg





  Q8H PRN   Administration





  Nausea And Vomiting  


 


Oxycodone/Acetaminophen  1 tab  11/25/19 20:12  11/29/19 03:16





  Percocet 5/325  PO   1 tab





  Q6H PRN   Administration





  Pain, Moderate (4-6)  


 


Pantoprazole Sodium  40 mg  11/25/19 10:00  11/30/19 09:11





  Protonix  PO   40 mg





  DAILY OCTAVIANO   Administration


 


Sodium Chloride  10 ml  11/21/19 22:07  11/26/19 05:30





  Sodium Chloride Flush Syringe 10 Ml  IV   10 ml





  PRN PRN   Administration





  LINE FLUSH

## 2019-11-30 NOTE — PROGRESS NOTE
Assessment and Plan


Assessment and plan: 


Patient is a 73 yo  man with a history of CHF, coronary artery disease,

diabetes, carotid stenosis, COPD and prior CVA  who presented with aphasia and 

facial droop. He received tpa and now able to mumble a couple of words. Patient 

has a history of carotid stenosis, refused surgery in the past per chart. 





* CTA head and neck reviewed


* Left Carotid Endarterectomy and Patch Angioplasty with Bovine Pericardium with

  vascular surgeon


* Passed swallow eval this am


* MRI consistent with Acute Ischemic changes in the left frontal and parietal 

  cortex with no evidence of hemorrhage





Assessment


Acute CVA, With Left Hemiplegia AND Dysathria: status post TPA: Neurology input 

noted, stroke protocol, PT/OT eval


Carotid stenosis: Vascular surgeon consulted, input noted-S/P Left Carotid 

Endarterectomy and Patch Angioplasty with Bovine Pericardium. follow with IR in 

2 weeks


CHF, stable


Coronary artery disease: Continue statin and antiplatetet


HTN:controled


Diabetes: Accu check/Ac/hs


COPD: stable


Coffee ground emesis- Evaluated by GI, stable, PPI recommended, no plan for 

scope at this time


DVT/GI prophy


PENDING PLACEMENT








History


Interval history: 


Patient seen and examined, remains with dysathria which is not new. Patient is 

clinically stable following the CEA. No new complaints today patient is pending 

placement to rehab discharge planning was done for today but still awaiting 

insurance approval


Tolerating diet. No new complaint








Hospitalist Physical





- Physical exam


Narrative exam: 


General appearance: Present: no acute distress, laying down in bed





- EENT


Eyes: Present: PERRL, EOM intact


ENT: hearing intact, clear oral mucosa, dentition normal





- Neck


Neck: Present: supple, normal ROM





- Respiratory


Respiratory effort: normal


Respiratory: bilateral: CTA





- Cardiovascular


Rhythm: regular





- Extremities


Extremities: pulses intact


Peripheral Pulses: within normal limits





- Abdominal


General gastrointestinal: soft, non-tender, non-distended





- Integumentary


Integumentary: Present: clear, warm, dry





- Neurologic


Neurologic: focal deficits, dysarthria 








- Constitutional


Vitals: 


                                        











Temp Pulse Resp BP Pulse Ox


 


 99.0 F   63   20   104/44   98 


 


 11/30/19 14:37  11/30/19 14:37  11/30/19 14:37  11/30/19 14:37  11/30/19 14:37











General appearance: Present: mild distress





Results





- Labs


CBC & Chem 7: 


                                 11/24/19 09:05





                                 11/25/19 12:00


Labs: 


                             Laboratory Last Values











WBC  7.6 K/mm3 (4.5-11.0)   11/24/19  09:05    


 


RBC  4.79 M/mm3 (3.65-5.03)   11/24/19  09:05    


 


Hgb  15.3 gm/dl (11.8-15.2)  H  11/24/19  09:05    


 


Hct  44.7 % (35.5-45.6)   11/24/19  09:05    


 


MCV  93 fl (84-94)   11/24/19  09:05    


 


MCH  32 pg (28-32)   11/24/19  09:05    


 


MCHC  34 % (32-34)   11/24/19  09:05    


 


RDW  14.8 % (13.2-15.2)   11/24/19  09:05    


 


Plt Count  179 K/mm3 (140-440)   11/24/19  09:05    


 


Lymph % (Auto)  24.6 % (13.4-35.0)   11/24/19  09:05    


 


Mono % (Auto)  8.7 % (0.0-7.3)  H  11/24/19  09:05    


 


Eos % (Auto)  3.5 % (0.0-4.3)   11/24/19  09:05    


 


Baso % (Auto)  0.3 % (0.0-1.8)   11/24/19  09:05    


 


Lymph #  1.9 K/mm3 (1.2-5.4)   11/24/19  09:05    


 


Mono #  0.7 K/mm3 (0.0-0.8)   11/24/19  09:05    


 


Eos #  0.3 K/mm3 (0.0-0.4)   11/24/19  09:05    


 


Baso #  0.0 K/mm3 (0.0-0.1)   11/24/19  09:05    


 


Seg Neutrophils %  62.9 % (40.0-70.0)   11/24/19  09:05    


 


Seg Neutrophils #  4.7 K/mm3 (1.8-7.7)   11/24/19  09:05    


 


PT  18.3 Sec. (12.2-14.9)  H  11/21/19  19:40    


 


INR  1.54  (0.87-1.13)  H  11/21/19  19:40    


 


APTT  47.9 Sec. (24.2-36.6)  H  11/21/19  19:40    


 


Thrombin Time  43.9 Sec. (15.1-19.6)  H  11/21/19  19:40    


 


Activated Clotting Time  153  ()  H  11/25/19  16:58    


 


Sodium  135 mmol/L (137-145)  L  11/25/19  12:00    


 


Potassium  4.0 mmol/L (3.6-5.0)   11/25/19  12:00    


 


Chloride  98.3 mmol/L ()   11/25/19  12:00    


 


Carbon Dioxide  21 mmol/L (22-30)  L  11/25/19  12:00    


 


Anion Gap  20 mmol/L  11/25/19  12:00    


 


BUN  17 mg/dL (9-20)   11/25/19  12:00    


 


Creatinine  0.7 mg/dL (0.8-1.5)  L  11/25/19  12:00    


 


Estimated GFR  > 60 ml/min  11/25/19  12:00    


 


BUN/Creatinine Ratio  24 %  11/25/19  12:00    


 


Glucose  120 mg/dL ()  H  11/25/19  12:00    


 


POC Glucose  152  ()  H  11/30/19  12:25    


 


Calcium  9.2 mg/dL (8.4-10.2)   11/25/19  12:00    


 


Total Bilirubin  0.40 mg/dL (0.1-1.2)   11/25/19  12:00    


 


Direct Bilirubin  < 0.2 mg/dL (0-0.2)   11/21/19  19:40    


 


AST  14 units/L (5-40)   11/25/19  12:00    


 


ALT  < 5 units/L (7-56)  L  11/25/19  12:00    


 


Alkaline Phosphatase  89 units/L ()   11/25/19  12:00    


 


Troponin T  < 0.010 ng/mL (0.00-0.029)   11/21/19  19:40    


 


Total Protein  7.0 g/dL (6.3-8.2)   11/25/19  12:00    


 


Albumin  3.7 g/dL (3.9-5)  L  11/25/19  12:00    


 


Albumin/Globulin Ratio  1.1 %  11/25/19  12:00    


 


Triglycerides  89 mg/dL (2-149)   11/22/19  03:59    


 


Cholesterol  153 mg/dL ()   11/22/19  03:59    


 


LDL Cholesterol Direct  108 mg/dL ()   11/22/19  03:59    


 


HDL Cholesterol  34 mg/dL (40-59)  L  11/22/19  03:59    


 


Cholesterol/HDL Ratio  4.50 %  11/22/19  03:59    


 


TSH  0.943 mlU/mL (0.270-4.200)   11/21/19  19:40    


 


Blood Type  O POSITIVE   11/25/19  07:58    


 


Antibody Screen  Negative   11/25/19  07:58    














Active Medications





- Current Medications


Current Medications: 














Generic Name Dose Route Start Last Admin





  Trade Name Freq  PRN Reason Stop Dose Admin


 


Acetaminophen  650 mg  11/21/19 22:07  11/24/19 10:45





  Tylenol  PO   650 mg





  Q4H PRN   Administration





  Pain, Mild (1-3)  


 


Atorvastatin Calcium  80 mg  11/22/19 22:00  11/29/19 21:16





  Lipitor  PO   80 mg





  [facility]HS OCTAVIANO   Administration


 


Bisacodyl  10 mg  11/21/19 22:07 





  Dulcolax  DE  





  QDAY PRN  





  Constipation  


 


Clopidogrel Bisulfate  75 mg  11/26/19 10:00  11/30/19 09:11





  Plavix  PO   75 mg





  QDAY OCTAVIANO   Administration


 


Gabapentin  300 mg  11/23/19 23:30  11/30/19 13:14





  Gabapentin  PO   300 mg





  TID OCTAVIANO   Administration


 


Hydralazine HCl  5 mg  11/22/19 02:21  11/23/19 00:12





  Apresoline  IV   5 mg





  Q6H PRN   Administration





  Hypertension  


 


Hydromorphone HCl  0.5 mg  11/25/19 20:12  11/27/19 04:13





  Dilaudid  IV   0.5 mg





  Q3H PRN   Administration





  Pain , Severe (7-10)  


 


Insulin Human Regular  0 units  11/24/19 07:30  11/30/19 13:14





  Humulin R  SUB-Q   2 units





  [facility] OCTAVIANO   Administration





  Protocol  


 


Magnesium Hydroxide  30 ml  11/21/19 22:07 





  Milk Of Magnesia  PO  





  Q4H PRN  





  Constipation  


 


Naloxone HCl  0.1 mg  11/25/19 20:12 





  Naloxone  IV  





  Q2MIN PRN  





  Res Rate </= 8 or 02 SAT < 92%  


 


Ondansetron HCl  4 mg  11/21/19 22:07  11/22/19 01:25





  Zofran  IV   4 mg





  Q8H PRN   Administration





  Nausea And Vomiting  


 


Oxycodone/Acetaminophen  1 tab  11/25/19 20:12  11/30/19 10:56





  Percocet 5/325  PO   1 tab





  Q6H PRN   Administration





  Pain, Moderate (4-6)  


 


Pantoprazole Sodium  40 mg  11/25/19 10:00  11/30/19 09:11





  Protonix  PO   40 mg





  DAILY OCTAVIANO   Administration


 


Sodium Chloride  10 ml  11/21/19 22:07  11/26/19 05:30





  Sodium Chloride Flush Syringe 10 Ml  IV   10 ml





  PRN PRN   Administration





  LINE FLUSH  














Nutrition/Malnutrition Assess





- Dietary Evaluation


Nutrition/Malnutrition Findings: 


                                        





Nutrition Notes                                            Start:  11/28/19 

13:50


Freq:                                                      Status: Active       




Protocol:                                                                       




 Document     11/28/19 13:50  LM  (Rec: 11/28/19 14:06  LM  [email or web address])


 Nutrition Notes


     Need for Assessment generated from:         LOS


     Initial or Follow up                        Assessment


     Current Diagnosis                           COPD,Diabetes,Hypertension,


                                                 Heart Failure,Stroke


     Other Pertinent Diagnosis                   carotid stenosis


     Current Diet                                Cardiac


     Labs/Tests                                  POC glu 115


     Pertinent Medications                       Reviewed


     Height                                      5 ft 5 in


     Weight                                      76.6 kg


     Usual Body Weight                           113.6 kg


     Ideal Body Weight (kg)                      61.81


     BMI                                         28.0


     Weight change and time frame                32.7% wt loss in 1.5 years


     Weight Status                               Overweight


     Subjective/Other Information                Screen for LOS. Pt stated he


                                                 has had a decreased appetite


                                                 for 4 days (since heart


                                                 surgury on 11/24). Pt family


                                                 brought in Thanksgiving food


                                                 for pt and pt ate 0%. Pt


                                                 stated he has lost 100 lb in 1


                                                 .5 years. Pt's family memeber


                                                 stated wt loss and health


                                                 declined since [facility].


                                                 Discussed trying ONS with pt.


                                                 Pt agreed to try.


     Burn                                        Absent


     Trauma                                      Absent


     GI Symptoms                                 None


     Current % PO                                Negligible


     Minimum of two criteria                     Yes


     Energy Intake (severe)                      < or equal to 50% Estimated


                                                 Energy Requirement > or equal


                                                 to 5 days


     Interpretation of Weight Loss (severe)      > 20% in 1 year


     #1


      Nutrition Diagnosis                        Malnutrition


      Etiology                                   Chronic illness


      As Evidenced by Signs and Symptoms         32.7% wt loss in 1.5 years, <


                                                 50% EER >5 day


     Is patient on ventilator?                   No


     Is Patient Ambulatory and/or Out of Bed     No


     REE-([facility]-confined to bed)      [phone]


     Calculation Used for Recommendations        [facility]


     Additional Notes                            Protein needs: 92-117g (1.2-1.


                                                 5g/kg)


                                                 Fluid: 1 ml/kcal of per MD


 Nutrition Intervention


     Change Diet Order:                          Continue Cardiac


     Add Supplement/Snack (indicate name/kcal    Glucerna chocolate once a day


      /protein )                                 


     Provides kCal:                              220


     Provides Protein (gm)                       10


     Goal #1                                     Meet at least 80% of energy


                                                 and preotein needs


     Anticipated Discharge Needs:                Cardiac/consisntent CHO diet


     Follow-Up By:                               12/02/19


     Additional Comments                         F/U for PO/ONS intakes, ONS


                                                 tolerance

## 2019-12-01 RX ADMIN — OXYCODONE AND ACETAMINOPHEN PRN TAB: 5; 325 TABLET ORAL at 15:56

## 2019-12-01 RX ADMIN — INSULIN HUMAN SCH: 100 INJECTION, SOLUTION PARENTERAL at 23:22

## 2019-12-01 RX ADMIN — INSULIN HUMAN SCH: 100 INJECTION, SOLUTION PARENTERAL at 17:09

## 2019-12-01 RX ADMIN — GABAPENTIN SCH MG: 300 CAPSULE ORAL at 20:00

## 2019-12-01 RX ADMIN — GABAPENTIN SCH MG: 300 CAPSULE ORAL at 09:16

## 2019-12-01 RX ADMIN — PANTOPRAZOLE SODIUM SCH MG: 40 TABLET, DELAYED RELEASE ORAL at 09:15

## 2019-12-01 RX ADMIN — INSULIN HUMAN SCH: 100 INJECTION, SOLUTION PARENTERAL at 09:07

## 2019-12-01 RX ADMIN — INSULIN HUMAN SCH UNITS: 100 INJECTION, SOLUTION PARENTERAL at 12:45

## 2019-12-01 RX ADMIN — GABAPENTIN SCH MG: 300 CAPSULE ORAL at 14:59

## 2019-12-01 RX ADMIN — CLOPIDOGREL BISULFATE SCH MG: 75 TABLET ORAL at 09:15

## 2019-12-01 NOTE — PROGRESS NOTE
Assessment and Plan


Assessment and plan: 


Patient is a 73 yo  man with a history of CHF, coronary artery disease,

diabetes, carotid stenosis, COPD and prior CVA  who presented with aphasia and 

facial droop. He received tpa and now able to mumble a couple of words. Patient 

has a history of carotid stenosis, refused surgery in the past per chart. 





* CTA head and neck reviewed


* Left Carotid Endarterectomy and Patch Angioplasty with Bovine Pericardium with

  vascular surgeon


* Passed swallow eval this am


* MRI consistent with Acute Ischemic changes in the left frontal and parietal 

  cortex with no evidence of hemorrhage





Assessment


Acute CVA, With Left Hemiplegia AND Dysathria: status post TPA: Neurology input 

noted, stroke protocol, PT/OT eval


Carotid stenosis: Vascular surgeon consulted, input noted-S/P Left Carotid 

Endarterectomy and Patch Angioplasty with Bovine Pericardium. follow with IR in 

2 weeks


CHF, stable


Coronary artery disease: Continue statin and antiplatetet


HTN:controled


Diabetes: Accu check/Ac/hs


COPD: stable


Coffee ground emesis- Evaluated by GI, stable, PPI recommended, no plan for 

scope at this time


DVT/GI prophy


PENDING PLACEMENT








History


Interval history: 


Patient seen and examined, remains with dysathria which is not new. Patient is 

clinically stable following the CEA. No new complaints today patient is pending 

placement to rehab discharge planning was done for today but still awaiting 

insurance approval. Tolerating diet. No new complaint








Hospitalist Physical





- Physical exam


Narrative exam: 


General appearance: Present: no acute distress, laying down in bed





- EENT


Eyes: Present: PERRL, EOM intact


ENT: hearing intact, clear oral mucosa, dentition normal





- Neck


Neck: Present: supple, normal ROM





- Respiratory


Respiratory effort: normal


Respiratory: bilateral: CTA





- Cardiovascular


Rhythm: regular





- Extremities


Extremities: pulses intact


Peripheral Pulses: within normal limits





- Abdominal


General gastrointestinal: soft, non-tender, non-distended





- Integumentary


Integumentary: Present: clear, warm, dry





- Neurologic


Neurologic: focal deficits, dysarthria 








- Constitutional


Vitals: 


                                        











Temp Pulse Resp BP Pulse Ox


 


 98.0 F   71   20   143/51   97 


 


 12/01/19 05:06  12/01/19 05:06  12/01/19 05:06  12/01/19 05:06  12/01/19 05:06











General appearance: Present: no acute distress





Results





- Labs


CBC & Chem 7: 


                                 11/24/19 09:05





                                 11/25/19 12:00


Labs: 


                             Laboratory Last Values











WBC  7.6 K/mm3 (4.5-11.0)   11/24/19  09:05    


 


RBC  4.79 M/mm3 (3.65-5.03)   11/24/19  09:05    


 


Hgb  15.3 gm/dl (11.8-15.2)  H  11/24/19  09:05    


 


Hct  44.7 % (35.5-45.6)   11/24/19  09:05    


 


MCV  93 fl (84-94)   11/24/19  09:05    


 


MCH  32 pg (28-32)   11/24/19  09:05    


 


MCHC  34 % (32-34)   11/24/19  09:05    


 


RDW  14.8 % (13.2-15.2)   11/24/19  09:05    


 


Plt Count  179 K/mm3 (140-440)   11/24/19  09:05    


 


Lymph % (Auto)  24.6 % (13.4-35.0)   11/24/19  09:05    


 


Mono % (Auto)  8.7 % (0.0-7.3)  H  11/24/19  09:05    


 


Eos % (Auto)  3.5 % (0.0-4.3)   11/24/19  09:05    


 


Baso % (Auto)  0.3 % (0.0-1.8)   11/24/19  09:05    


 


Lymph #  1.9 K/mm3 (1.2-5.4)   11/24/19  09:05    


 


Mono #  0.7 K/mm3 (0.0-0.8)   11/24/19  09:05    


 


Eos #  0.3 K/mm3 (0.0-0.4)   11/24/19  09:05    


 


Baso #  0.0 K/mm3 (0.0-0.1)   11/24/19  09:05    


 


Seg Neutrophils %  62.9 % (40.0-70.0)   11/24/19  09:05    


 


Seg Neutrophils #  4.7 K/mm3 (1.8-7.7)   11/24/19  09:05    


 


PT  18.3 Sec. (12.2-14.9)  H  11/21/19  19:40    


 


INR  1.54  (0.87-1.13)  H  11/21/19  19:40    


 


APTT  47.9 Sec. (24.2-36.6)  H  11/21/19  19:40    


 


Thrombin Time  43.9 Sec. (15.1-19.6)  H  11/21/19  19:40    


 


Activated Clotting Time  153  ()  H  11/25/19  16:58    


 


Sodium  135 mmol/L (137-145)  L  11/25/19  12:00    


 


Potassium  4.0 mmol/L (3.6-5.0)   11/25/19  12:00    


 


Chloride  98.3 mmol/L ()   11/25/19  12:00    


 


Carbon Dioxide  21 mmol/L (22-30)  L  11/25/19  12:00    


 


Anion Gap  20 mmol/L  11/25/19  12:00    


 


BUN  17 mg/dL (9-20)   11/25/19  12:00    


 


Creatinine  0.7 mg/dL (0.8-1.5)  L  11/25/19  12:00    


 


Estimated GFR  > 60 ml/min  11/25/19  12:00    


 


BUN/Creatinine Ratio  24 %  11/25/19  12:00    


 


Glucose  120 mg/dL ()  H  11/25/19  12:00    


 


POC Glucose  179  ()  H  12/01/19  11:45    


 


Calcium  9.2 mg/dL (8.4-10.2)   11/25/19  12:00    


 


Total Bilirubin  0.40 mg/dL (0.1-1.2)   11/25/19  12:00    


 


Direct Bilirubin  < 0.2 mg/dL (0-0.2)   11/21/19  19:40    


 


AST  14 units/L (5-40)   11/25/19  12:00    


 


ALT  < 5 units/L (7-56)  L  11/25/19  12:00    


 


Alkaline Phosphatase  89 units/L ()   11/25/19  12:00    


 


Troponin T  < 0.010 ng/mL (0.00-0.029)   11/21/19  19:40    


 


Total Protein  7.0 g/dL (6.3-8.2)   11/25/19  12:00    


 


Albumin  3.7 g/dL (3.9-5)  L  11/25/19  12:00    


 


Albumin/Globulin Ratio  1.1 %  11/25/19  12:00    


 


Triglycerides  89 mg/dL (2-149)   11/22/19  03:59    


 


Cholesterol  153 mg/dL ()   11/22/19  03:59    


 


LDL Cholesterol Direct  108 mg/dL ()   11/22/19  03:59    


 


HDL Cholesterol  34 mg/dL (40-59)  L  11/22/19  03:59    


 


Cholesterol/HDL Ratio  4.50 %  11/22/19  03:59    


 


TSH  0.943 mlU/mL (0.270-4.200)   11/21/19  19:40    


 


Blood Type  O POSITIVE   11/25/19  07:58    


 


Antibody Screen  Negative   11/25/19  07:58    














Active Medications





- Current Medications


Current Medications: 














Generic Name Dose Route Start Last Admin





  Trade Name Freq  PRN Reason Stop Dose Admin


 


Acetaminophen  650 mg  11/21/19 22:07  11/24/19 10:45





  Tylenol  PO   650 mg





  Q4H PRN   Administration





  Pain, Mild (1-3)  


 


Atorvastatin Calcium  80 mg  11/22/19 22:00  11/30/19 22:48





  Lipitor  PO   80 mg





  QHS OCTAVIANO   Administration


 


Bisacodyl  10 mg  11/21/19 22:07  12/01/19 09:16





  Dulcolax  DC   10 mg





  QDAY PRN   Administration





  Constipation  


 


Clopidogrel Bisulfate  75 mg  11/26/19 10:00  12/01/19 09:15





  Plavix  PO   75 mg





  QDAY OCTAVIANO   Administration


 


Gabapentin  300 mg  11/23/19 23:30  12/01/19 09:16





  Gabapentin  PO   300 mg





  TID OCTAVIANO   Administration


 


Hydralazine HCl  5 mg  11/22/19 02:21  11/23/19 00:12





  Apresoline  IV   5 mg





  Q6H PRN   Administration





  Hypertension  


 


Hydromorphone HCl  0.5 mg  11/25/19 20:12  11/27/19 04:13





  Dilaudid  IV   0.5 mg





  Q3H PRN   Administration





  Pain , Severe (7-10)  


 


Insulin Human Regular  0 units  11/24/19 07:30  12/01/19 09:07





  Humulin R  SUB-Q   Not Given





  [facility]  





  Protocol  


 


Magnesium Hydroxide  30 ml  11/21/19 22:07  12/01/19 09:16





  Milk Of Magnesia  PO   30 ml





  Q4H PRN   Administration





  Constipation  


 


Naloxone HCl  0.1 mg  11/25/19 20:12 





  Naloxone  IV  





  Q2MIN PRN  





  Res Rate </= 8 or 02 SAT < 92%  


 


Ondansetron HCl  4 mg  11/21/19 22:07  11/22/19 01:25





  Zofran  IV   4 mg





  Q8H PRN   Administration





  Nausea And Vomiting  


 


Oxycodone/Acetaminophen  1 tab  11/25/19 20:12  11/30/19 22:50





  Percocet 5/325  PO   1 tab





  Q6H PRN   Administration





  Pain, Moderate (4-6)  


 


Pantoprazole Sodium  40 mg  11/25/19 10:00  12/01/19 09:15





  Protonix  PO   40 mg





  DAILY OCTAVIANO   Administration


 


Sodium Chloride  10 ml  11/21/19 22:07  11/26/19 05:30





  Sodium Chloride Flush Syringe 10 Ml  IV   10 ml





  PRN PRN   Administration





  LINE FLUSH  














Nutrition/Malnutrition Assess





- Dietary Evaluation


Nutrition/Malnutrition Findings: 


                                        





Nutrition Notes                                            Start:  11/28/19 

13:50


Freq:                                                      Status: Active       




Protocol:                                                                       




 Document     11/28/19 13:50  LM  (Rec: 11/28/19 14:06  LM  [email or web address])


 Nutrition Notes


     Need for Assessment generated from:         LOS


     Initial or Follow up                        Assessment


     Current Diagnosis                           COPD,Diabetes,Hypertension,


                                                 Heart Failure,Stroke


     Other Pertinent Diagnosis                   carotid stenosis


     Current Diet                                Cardiac


     Labs/Tests                                  POC glu 115


     Pertinent Medications                       Reviewed


     Height                                      5 ft 5 in


     Weight                                      76.6 kg


     Usual Body Weight                           113.6 kg


     Ideal Body Weight (kg)                      61.81


     BMI                                         28.0


     Weight change and time frame                32.7% wt loss in 1.5 years


     Weight Status                               Overweight


     Subjective/Other Information                Screen for LOS. Pt stated he


                                                 has had a decreased appetite


                                                 for 4 days (since heart


                                                 surgury on 11/24). Pt family


                                                 brought in Thanksgiving food


                                                 for pt and pt ate 0%. Pt


                                                 stated he has lost 100 lb in 1


                                                 .5 years. Pt's family memeber


                                                 stated wt loss and health


                                                 declined since snf.


                                                 Discussed trying ONS with pt.


                                                 Pt agreed to try.


     Burn                                        Absent


     Trauma                                      Absent


     GI Symptoms                                 None


     Current % PO                                Negligible


     Minimum of two criteria                     Yes


     Energy Intake (severe)                      < or equal to 50% Estimated


                                                 Energy Requirement > or equal


                                                 to 5 days


     Interpretation of Weight Loss (severe)      > 20% in 1 year


     #1


      Nutrition Diagnosis                        Malnutrition


      Etiology                                   Chronic illness


      As Evidenced by Signs and Symptoms         32.7% wt loss in 1.5 years, <


                                                 50% EER >5 day


     Is patient on ventilator?                   No


     Is Patient Ambulatory and/or Out of Bed     No


     REE-([facility]-confined to bed)      [phone]


     Calculation Used for Recommendations        [facility]


     Additional Notes                            Protein needs: 92-117g (1.2-1.


                                                 5g/kg)


                                                 Fluid: 1 ml/kcal of per MD


 Nutrition Intervention


     Change Diet Order:                          Continue Cardiac


     Add Supplement/Snack (indicate name/kcal    Glucerna chocolate once a day


      /protein )                                 


     Provides kCal:                              220


     Provides Protein (gm)                       10


     Goal #1                                     Meet at least 80% of energy


                                                 and preotein needs


     Anticipated Discharge Needs:                Cardiac/consisntent CHO diet


     Follow-Up By:                               12/02/19


     Additional Comments                         F/U for PO/ONS intakes, ONS


                                                 tolerance

## 2019-12-01 NOTE — PROGRESS NOTE
Assessment and Plan





Awaiting discharge to acute rehabilitation.  No additional vascular 

interventions plan at this time.





Subjective


Date of service: 12/01/19


Principal diagnosis: left carotid stenosis, left CVA


Interval history: 





Patient doing well following left carotid endarterectomy.  Awaiting transfer to 

rehabilitation facility.  No specific complaints.  Dysarthria stable.





Objective





- Constitutional


Vitals: 


                               Vital Signs - 12hr











  11/30/19 12/01/19





  22:57 05:06


 


Temperature 97.6 F 98.0 F


 


Pulse Rate 67 71


 


Respiratory 22 20





Rate  


 


Blood Pressure 157/55 143/51


 


O2 Sat by Pulse 98 97





Oximetry  











General appearance: Present: no acute distress





- EENT


Eyes: EOM intact


ENT: hearing intact





- Neck


Neck: supple, normal ROM





- Respiratory


Respiratory effort: normal





- Breasts


Breasts: deferred


Extremities: Full ROM





- Gastrointestinal


General gastrointestinal: Present: deferred


Rectal Exam: deferred





- Genitourinary


Male genitourinary: deferred





- Musculoskeletal


Musculoskeletal: right sided weakness





- Psychiatric


Psychiatric: cooperative





- Labs


CBC & Chem 7: 


                                 11/24/19 09:05





                                 11/25/19 12:00


Labs: 


                              Abnormal lab results











  11/30/19 11/30/19 11/30/19 Range/Units





  12:25 17:58 21:43 


 


POC Glucose  152 H  135 H  160 H  ()  














  12/01/19 Range/Units





  08:14 


 


POC Glucose  149 H  ()  














Medications & Allergies





- Medications


Allergies/Adverse Reactions: 


                                    Allergies





Penicillins Allergy (Verified 11/23/19 16:45)


   Rash








Home Medications: 


                                Home Medications











 Medication  Instructions  Recorded  Confirmed  Last Taken  Type


 


Gabapentin 300 mg PO TID 11/22/19 11/22/19 Unknown History


 


Losartan Potassium 25 mg PO DAILY 11/22/19 11/22/19 Unknown History


 


Pantoprazole 40 mg PO DAILY 11/22/19 11/22/19 Unknown History


 


metFORMIN 500 mg PO BID 11/22/19 11/22/19 Unknown History


 


AtorvaSTATin [Lipitor] 80 mg PO QHS #30 tablet 11/27/19  Unknown Rx


 


Clopidogrel [Plavix] 75 mg PO QDAY #30 tablet 11/27/19  Unknown Rx











Active Medications: 














Generic Name Dose Route Start Last Admin





  Trade Name Freq  PRN Reason Stop Dose Admin


 


Acetaminophen  650 mg  11/21/19 22:07  11/24/19 10:45





  Tylenol  PO   650 mg





  Q4H PRN   Administration





  Pain, Mild (1-3)  


 


Atorvastatin Calcium  80 mg  11/22/19 22:00  11/30/19 22:48





  Lipitor  PO   80 mg





  QHS OCTAVIANO   Administration


 


Bisacodyl  10 mg  11/21/19 22:07  12/01/19 09:16





  Dulcolax  [place]   10 mg





  QDAY PRN   Administration





  Constipation  


 


Clopidogrel Bisulfate  75 mg  11/26/19 10:00  12/01/19 09:15





  Plavix  PO   75 mg





  QDAY OCTAVIANO   Administration


 


Gabapentin  300 mg  11/23/19 23:30  12/01/19 09:16





  Gabapentin  PO   300 mg





  TID OCTAVIANO   Administration


 


Hydralazine HCl  5 mg  11/22/19 02:21  11/23/19 00:12





  Apresoline  IV   5 mg





  Q6H PRN   Administration





  Hypertension  


 


Hydromorphone HCl  0.5 mg  11/25/19 20:12  11/27/19 04:13





  Dilaudid  IV   0.5 mg





  Q3H PRN   Administration





  Pain , Severe (7-10)  


 


Insulin Human Regular  0 units  11/24/19 07:30  12/01/19 09:07





  Humulin R  SUB-Q   Not Given





  [facility]  





  Protocol  


 


Magnesium Hydroxide  30 ml  11/21/19 22:07  12/01/19 09:16





  Milk Of Magnesia  PO   30 ml





  Q4H PRN   Administration





  Constipation  


 


Naloxone HCl  0.1 mg  11/25/19 20:12 





  Naloxone  IV  





  Q2MIN PRN  





  Res Rate </= 8 or 02 SAT < 92%  


 


Ondansetron HCl  4 mg  11/21/19 22:07  11/22/19 01:25





  Zofran  IV   4 mg





  Q8H PRN   Administration





  Nausea And Vomiting  


 


Oxycodone/Acetaminophen  1 tab  11/25/19 20:12  11/30/19 22:50





  Percocet 5/325  PO   1 tab





  Q6H PRN   Administration





  Pain, Moderate (4-6)  


 


Pantoprazole Sodium  40 mg  11/25/19 10:00  12/01/19 09:15





  Protonix  PO   40 mg





  DAILY OCTAVIANO   Administration


 


Sodium Chloride  10 ml  11/21/19 22:07  11/26/19 05:30





  Sodium Chloride Flush Syringe 10 Ml  IV   10 ml





  PRN PRN   Administration





  LINE FLUSH

## 2019-12-02 RX ADMIN — ASPIRIN SCH MG: 81 TABLET, COATED ORAL at 10:48

## 2019-12-02 RX ADMIN — GABAPENTIN SCH MG: 300 CAPSULE ORAL at 13:59

## 2019-12-02 RX ADMIN — GABAPENTIN SCH MG: 300 CAPSULE ORAL at 08:29

## 2019-12-02 RX ADMIN — CLOPIDOGREL BISULFATE SCH MG: 75 TABLET ORAL at 10:48

## 2019-12-02 RX ADMIN — INSULIN HUMAN SCH: 100 INJECTION, SOLUTION PARENTERAL at 08:29

## 2019-12-02 RX ADMIN — PANTOPRAZOLE SODIUM SCH MG: 40 TABLET, DELAYED RELEASE ORAL at 10:48

## 2019-12-02 RX ADMIN — OXYCODONE AND ACETAMINOPHEN PRN TAB: 5; 325 TABLET ORAL at 07:28

## 2019-12-02 RX ADMIN — INSULIN HUMAN SCH: 100 INJECTION, SOLUTION PARENTERAL at 22:40

## 2019-12-02 RX ADMIN — INSULIN HUMAN SCH: 100 INJECTION, SOLUTION PARENTERAL at 16:30

## 2019-12-02 RX ADMIN — INSULIN HUMAN SCH: 100 INJECTION, SOLUTION PARENTERAL at 12:07

## 2019-12-02 RX ADMIN — GABAPENTIN SCH MG: 300 CAPSULE ORAL at 22:40

## 2019-12-02 NOTE — PROGRESS NOTE
Assessment and Plan


Assessment and plan: 


Patient is a 75 yo  man with a history of CHF, coronary artery disease,

diabetes, carotid stenosis, COPD and prior CVA  who presented with aphasia and 

facial droop. He received tpa and now able to mumble a couple of words. Patient 

has a history of carotid stenosis, refused surgery in the past per chart. 





* CTA head and neck reviewed


* Left Carotid Endarterectomy and Patch Angioplasty with Bovine Pericardium with

  vascular surgeon


* Passed swallow eval this am


* MRI consistent with Acute Ischemic changes in the left frontal and parietal 

  cortex with no evidence of hemorrhage


* Patient remains lethargic, awaiting placement. 





Assessment


Acute CVA, With Left Hemiplegia AND Dysathria: status post TPA: Neurology input 

noted, stroke protocol, PT/OT eval


Carotid stenosis: Vascular surgeon consulted, input noted-S/P Left Carotid 

Endarterectomy and Patch Angioplasty with Bovine Pericardium. follow with IR in 

2 weeks


CHF, stable


Coronary artery disease: Continue statin and antiplatetet


HTN:controled


Diabetes: Accu check/Ac/hs


COPD: stable


Coffee ground emesis- Evaluated by GI, stable, PPI recommended, no plan for 

scope at this time


DVT/GI prophy


PENDING PLACEMENT








History


Interval history: 


Patient seen and examined, remains with dysathria which is not new. Patient is 

clinically stable following the CEA. No new complaints today patient is pending 

placement to rehab discharge planning was done for today but still awaiting 

insurance approval. Tolerating diet. No new complaint








Hospitalist Physical





- Physical exam


Narrative exam: 


General appearance: Present: no acute distress, laying down in bed





- EENT


Eyes: Present: PERRL, EOM intact


ENT: hearing intact, clear oral mucosa, dentition normal





- Neck


Neck: Present: supple, normal ROM





- Respiratory


Respiratory effort: normal


Respiratory: bilateral: CTA





- Cardiovascular


Rhythm: regular





- Extremities


Extremities: pulses intact


Peripheral Pulses: within normal limits





- Abdominal


General gastrointestinal: soft, non-tender, non-distended





- Integumentary


Integumentary: Present: clear, warm, dry





- Neurologic


Neurologic: focal deficits, dysarthria 








- Constitutional


Vitals: 


                                        











Temp Pulse Resp BP Pulse Ox


 


 97.9 F   67   18   146/56   98 


 


 12/02/19 17:06  12/02/19 17:06  12/02/19 17:06  12/02/19 17:06  12/02/19 17:06











General appearance: Present: no acute distress





Results





- Labs


CBC & Chem 7: 


                                 11/24/19 09:05





                                 11/25/19 12:00


Labs: 


                             Laboratory Last Values











WBC  7.6 K/mm3 (4.5-11.0)   11/24/19  09:05    


 


RBC  4.79 M/mm3 (3.65-5.03)   11/24/19  09:05    


 


Hgb  15.3 gm/dl (11.8-15.2)  H  11/24/19  09:05    


 


Hct  44.7 % (35.5-45.6)   11/24/19  09:05    


 


MCV  93 fl (84-94)   11/24/19  09:05    


 


MCH  32 pg (28-32)   11/24/19  09:05    


 


MCHC  34 % (32-34)   11/24/19  09:05    


 


RDW  14.8 % (13.2-15.2)   11/24/19  09:05    


 


Plt Count  179 K/mm3 (140-440)   11/24/19  09:05    


 


Lymph % (Auto)  24.6 % (13.4-35.0)   11/24/19  09:05    


 


Mono % (Auto)  8.7 % (0.0-7.3)  H  11/24/19  09:05    


 


Eos % (Auto)  3.5 % (0.0-4.3)   11/24/19  09:05    


 


Baso % (Auto)  0.3 % (0.0-1.8)   11/24/19  09:05    


 


Lymph #  1.9 K/mm3 (1.2-5.4)   11/24/19  09:05    


 


Mono #  0.7 K/mm3 (0.0-0.8)   11/24/19  09:05    


 


Eos #  0.3 K/mm3 (0.0-0.4)   11/24/19  09:05    


 


Baso #  0.0 K/mm3 (0.0-0.1)   11/24/19  09:05    


 


Seg Neutrophils %  62.9 % (40.0-70.0)   11/24/19  09:05    


 


Seg Neutrophils #  4.7 K/mm3 (1.8-7.7)   11/24/19  09:05    


 


PT  18.3 Sec. (12.2-14.9)  H  11/21/19  19:40    


 


INR  1.54  (0.87-1.13)  H  11/21/19  19:40    


 


APTT  47.9 Sec. (24.2-36.6)  H  11/21/19  19:40    


 


Thrombin Time  43.9 Sec. (15.1-19.6)  H  11/21/19  19:40    


 


Activated Clotting Time  153  ()  H  11/25/19  16:58    


 


Sodium  135 mmol/L (137-145)  L  11/25/19  12:00    


 


Potassium  4.0 mmol/L (3.6-5.0)   11/25/19  12:00    


 


Chloride  98.3 mmol/L ()   11/25/19  12:00    


 


Carbon Dioxide  21 mmol/L (22-30)  L  11/25/19  12:00    


 


Anion Gap  20 mmol/L  11/25/19  12:00    


 


BUN  17 mg/dL (9-20)   11/25/19  12:00    


 


Creatinine  0.7 mg/dL (0.8-1.5)  L  11/25/19  12:00    


 


Estimated GFR  > 60 ml/min  11/25/19  12:00    


 


BUN/Creatinine Ratio  24 %  11/25/19  12:00    


 


Glucose  120 mg/dL ()  H  11/25/19  12:00    


 


POC Glucose  127  ()  H  12/02/19  16:20    


 


Calcium  9.2 mg/dL (8.4-10.2)   11/25/19  12:00    


 


Total Bilirubin  0.40 mg/dL (0.1-1.2)   11/25/19  12:00    


 


Direct Bilirubin  < 0.2 mg/dL (0-0.2)   11/21/19  19:40    


 


AST  14 units/L (5-40)   11/25/19  12:00    


 


ALT  < 5 units/L (7-56)  L  11/25/19  12:00    


 


Alkaline Phosphatase  89 units/L ()   11/25/19  12:00    


 


Troponin T  < 0.010 ng/mL (0.00-0.029)   11/21/19  19:40    


 


Total Protein  7.0 g/dL (6.3-8.2)   11/25/19  12:00    


 


Albumin  3.7 g/dL (3.9-5)  L  11/25/19  12:00    


 


Albumin/Globulin Ratio  1.1 %  11/25/19  12:00    


 


Triglycerides  89 mg/dL (2-149)   11/22/19  03:59    


 


Cholesterol  153 mg/dL ()   11/22/19  03:59    


 


LDL Cholesterol Direct  108 mg/dL ()   11/22/19  03:59    


 


HDL Cholesterol  34 mg/dL (40-59)  L  11/22/19  03:59    


 


Cholesterol/HDL Ratio  4.50 %  11/22/19  03:59    


 


TSH  0.943 mlU/mL (0.270-4.200)   11/21/19  19:40    


 


Blood Type  O POSITIVE   11/25/19  07:58    


 


Antibody Screen  Negative   11/25/19  07:58    














Active Medications





- Current Medications


Current Medications: 














Generic Name Dose Route Start Last Admin





  Trade Name Freq  PRN Reason Stop Dose Admin


 


Acetaminophen  650 mg  11/21/19 22:07  11/24/19 10:45





  Tylenol  PO   650 mg





  Q4H PRN   Administration





  Pain, Mild (1-3)  


 


Aspirin  81 mg  12/02/19 10:00  12/02/19 10:48





  Halfprin Ec  PO   81 mg





  QDAY OCTAVIANO   Administration


 


Atorvastatin Calcium  80 mg  11/22/19 22:00  12/01/19 21:27





  Lipitor  PO   80 mg





  QHS OCTAVIANO   Administration


 


Bisacodyl  10 mg  11/21/19 22:07  12/01/19 09:16





  Dulcolax  MD   10 mg





  QDAY PRN   Administration





  Constipation  


 


Clopidogrel Bisulfate  75 mg  11/26/19 10:00  12/02/19 10:48





  Plavix  PO   75 mg





  QDAY OCTAVIANO   Administration


 


Gabapentin  300 mg  11/23/19 23:30  12/02/19 13:59





  Gabapentin  PO   300 mg





  TID OCTAVIANO   Administration


 


Hydralazine HCl  5 mg  11/22/19 02:21  11/23/19 00:12





  Apresoline  IV   5 mg





  Q6H PRN   Administration





  Hypertension  


 


Hydromorphone HCl  0.5 mg  11/25/19 20:12  11/27/19 04:13





  Dilaudid  IV   0.5 mg





  Q3H PRN   Administration





  Pain , Severe (7-10)  


 


Insulin Human Regular  0 units  11/24/19 07:30  12/02/19 12:07





  Humulin R  SUB-Q   Not Given





  [facility]  





  Protocol  


 


Magnesium Hydroxide  30 ml  11/21/19 22:07  12/01/19 09:16





  Milk Of Magnesia  PO   30 ml





  Q4H PRN   Administration





  Constipation  


 


Naloxone HCl  0.1 mg  11/25/19 20:12 





  Naloxone  IV  





  Q2MIN PRN  





  Res Rate </= 8 or 02 SAT < 92%  


 


Ondansetron HCl  4 mg  11/21/19 22:07  11/22/19 01:25





  Zofran  IV   4 mg





  Q8H PRN   Administration





  Nausea And Vomiting  


 


Oxycodone/Acetaminophen  1 tab  11/25/19 20:12  12/02/19 07:28





  Percocet 5/325  PO   1 tab





  Q6H PRN   Administration





  Pain, Moderate (4-6)  


 


Pantoprazole Sodium  40 mg  11/25/19 10:00  12/02/19 10:48





  Protonix  PO   40 mg





  DAILY OCTAVIANO   Administration


 


Sodium Chloride  10 ml  11/21/19 22:07  11/26/19 05:30





  Sodium Chloride Flush Syringe 10 Ml  IV   10 ml





  PRN PRN   Administration





  LINE FLUSH  














Nutrition/Malnutrition Assess





- Dietary Evaluation


Nutrition/Malnutrition Findings: 


                                        





Nutrition Notes                                            Start:  11/28/19 

13:50


Freq:                                                      Status: Active       




Protocol:                                                                       




 Document     12/02/19 14:55  LM  (Rec: 12/02/19 14:56  LM  [email or web address])


 Nutrition Notes


     Initial or Follow up                        Brief Note


     Subjective/Other Information                Pt eating 100% and his


                                                 appetite has much improved.


 Nutrition Intervention


     Revisit per MD consult or patient           Sign Off


      request:

## 2019-12-03 RX ADMIN — GABAPENTIN SCH MG: 300 CAPSULE ORAL at 08:13

## 2019-12-03 RX ADMIN — OXYCODONE AND ACETAMINOPHEN PRN TAB: 5; 325 TABLET ORAL at 08:13

## 2019-12-03 RX ADMIN — GABAPENTIN SCH MG: 300 CAPSULE ORAL at 13:03

## 2019-12-03 RX ADMIN — PANTOPRAZOLE SODIUM SCH MG: 40 TABLET, DELAYED RELEASE ORAL at 09:35

## 2019-12-03 RX ADMIN — GABAPENTIN SCH MG: 300 CAPSULE ORAL at 20:54

## 2019-12-03 RX ADMIN — INSULIN HUMAN SCH: 100 INJECTION, SOLUTION PARENTERAL at 17:50

## 2019-12-03 RX ADMIN — INSULIN HUMAN SCH: 100 INJECTION, SOLUTION PARENTERAL at 08:08

## 2019-12-03 RX ADMIN — CLOPIDOGREL BISULFATE SCH MG: 75 TABLET ORAL at 09:35

## 2019-12-03 RX ADMIN — INSULIN HUMAN SCH UNITS: 100 INJECTION, SOLUTION PARENTERAL at 13:02

## 2019-12-03 RX ADMIN — INSULIN HUMAN SCH UNITS: 100 INJECTION, SOLUTION PARENTERAL at 21:55

## 2019-12-03 RX ADMIN — ASPIRIN SCH MG: 81 TABLET, COATED ORAL at 09:35

## 2019-12-03 NOTE — PROGRESS NOTE
Assessment and Plan


Assessment and plan: 





Acute CVA, With Left Hemiplegia and Dysathria


-status post TPA, stable


-cont asa, statin and plavix


-cont PT/OT 


-neurology consulted





LT high grade Carotid stenosis 


-Vascular surgeon-S/P Left Carotid Endarterectomy and Patch Angioplasty with 

Bovine Pericardium. 


-out-pt f/u in 2 weeks





Chronic systolic HF with EF of 30-35%


-stable


Coronary artery disease


-stable


-Continue statin and plavix





HTN


-controlled





DM2 with neuropathy


-stable on SSI


-Continue gabapentin


-cont Accu check/Ac/hs





COPD


-stable





Coffee ground emesis


-Evaluated by GI, stable, PPI recommended, no plan for scope at this time


-H/H stable





DVT prophylaxis: SCD








Disposition: Rehabilitation placement pending

















History


Interval history: 





Patient has no new complaints.  He denies chest pain or shortness of breath.





Hospitalist Physical





- Constitutional


Vitals: 


                                        











Temp Pulse Resp BP Pulse Ox


 


 97.0 F L  61   20   106/49   98 


 


 12/03/19 11:51  12/03/19 11:51  12/03/19 11:51  12/03/19 11:51  12/03/19 11:51











General appearance: Present: no acute distress





- EENT


Eyes: Present: PERRL, EOM intact


ENT: clear oral mucosa





- Neck


Neck: Present: supple





- Respiratory


Respiratory effort: normal


Respiratory: bilateral: CTA





- Cardiovascular


Rhythm: regular


Heart Sounds: Present: S1 & S2





- Extremities


Extremities: No edema





- Abdominal


General gastrointestinal: soft, non-tender, normal bowel sounds





- Integumentary


Integumentary: Present: warm, dry





- Psychiatric


Psychiatric: cooperative





- Neurologic


Neurologic: focal deficits





Results





- Labs


CBC & Chem 7: 


                                 11/24/19 09:05





                                 11/25/19 12:00


Labs: 


                             Laboratory Last Values











WBC  7.6 K/mm3 (4.5-11.0)   11/24/19  09:05    


 


RBC  4.79 M/mm3 (3.65-5.03)   11/24/19  09:05    


 


Hgb  15.3 gm/dl (11.8-15.2)  H  11/24/19  09:05    


 


Hct  44.7 % (35.5-45.6)   11/24/19  09:05    


 


MCV  93 fl (84-94)   11/24/19  09:05    


 


MCH  32 pg (28-32)   11/24/19  09:05    


 


MCHC  34 % (32-34)   11/24/19  09:05    


 


RDW  14.8 % (13.2-15.2)   11/24/19  09:05    


 


Plt Count  179 K/mm3 (140-440)   11/24/19  09:05    


 


Lymph % (Auto)  24.6 % (13.4-35.0)   11/24/19  09:05    


 


Mono % (Auto)  8.7 % (0.0-7.3)  H  11/24/19  09:05    


 


Eos % (Auto)  3.5 % (0.0-4.3)   11/24/19  09:05    


 


Baso % (Auto)  0.3 % (0.0-1.8)   11/24/19  09:05    


 


Lymph #  1.9 K/mm3 (1.2-5.4)   11/24/19  09:05    


 


Mono #  0.7 K/mm3 (0.0-0.8)   11/24/19  09:05    


 


Eos #  0.3 K/mm3 (0.0-0.4)   11/24/19  09:05    


 


Baso #  0.0 K/mm3 (0.0-0.1)   11/24/19  09:05    


 


Seg Neutrophils %  62.9 % (40.0-70.0)   11/24/19  09:05    


 


Seg Neutrophils #  4.7 K/mm3 (1.8-7.7)   11/24/19  09:05    


 


PT  18.3 Sec. (12.2-14.9)  H  11/21/19  19:40    


 


INR  1.54  (0.87-1.13)  H  11/21/19  19:40    


 


APTT  47.9 Sec. (24.2-36.6)  H  11/21/19  19:40    


 


Thrombin Time  43.9 Sec. (15.1-19.6)  H  11/21/19  19:40    


 


Activated Clotting Time  153  ()  H  11/25/19  16:58    


 


Sodium  135 mmol/L (137-145)  L  11/25/19  12:00    


 


Potassium  4.0 mmol/L (3.6-5.0)   11/25/19  12:00    


 


Chloride  98.3 mmol/L ()   11/25/19  12:00    


 


Carbon Dioxide  21 mmol/L (22-30)  L  11/25/19  12:00    


 


Anion Gap  20 mmol/L  11/25/19  12:00    


 


BUN  17 mg/dL (9-20)   11/25/19  12:00    


 


Creatinine  0.7 mg/dL (0.8-1.5)  L  11/25/19  12:00    


 


Estimated GFR  > 60 ml/min  11/25/19  12:00    


 


BUN/Creatinine Ratio  24 %  11/25/19  12:00    


 


Glucose  120 mg/dL ()  H  11/25/19  12:00    


 


POC Glucose  359  ()  H  12/03/19  12:02    


 


Calcium  9.2 mg/dL (8.4-10.2)   11/25/19  12:00    


 


Total Bilirubin  0.40 mg/dL (0.1-1.2)   11/25/19  12:00    


 


Direct Bilirubin  < 0.2 mg/dL (0-0.2)   11/21/19  19:40    


 


AST  14 units/L (5-40)   11/25/19  12:00    


 


ALT  < 5 units/L (7-56)  L  11/25/19  12:00    


 


Alkaline Phosphatase  89 units/L ()   11/25/19  12:00    


 


Troponin T  < 0.010 ng/mL (0.00-0.029)   11/21/19  19:40    


 


Total Protein  7.0 g/dL (6.3-8.2)   11/25/19  12:00    


 


Albumin  3.7 g/dL (3.9-5)  L  11/25/19  12:00    


 


Albumin/Globulin Ratio  1.1 %  11/25/19  12:00    


 


Triglycerides  89 mg/dL (2-149)   11/22/19  03:59    


 


Cholesterol  153 mg/dL ()   11/22/19  03:59    


 


LDL Cholesterol Direct  108 mg/dL ()   11/22/19  03:59    


 


HDL Cholesterol  34 mg/dL (40-59)  L  11/22/19  03:59    


 


Cholesterol/HDL Ratio  4.50 %  11/22/19  03:59    


 


TSH  0.943 mlU/mL (0.270-4.200)   11/21/19  19:40    


 


Blood Type  O POSITIVE   11/25/19  07:58    


 


Antibody Screen  Negative   11/25/19  07:58    














Active Medications





- Current Medications


Current Medications: 














Generic Name Dose Route Start Last Admin





  Trade Name Freq  PRN Reason Stop Dose Admin


 


Acetaminophen  650 mg  11/21/19 22:07  11/24/19 10:45





  Tylenol  PO   650 mg





  Q4H PRN   Administration





  Pain, Mild (1-3)  


 


Aspirin  81 mg  12/02/19 10:00  12/03/19 09:35





  Halfprin Ec  PO   81 mg





  QDAY OCTAVIANO   Administration


 


Atorvastatin Calcium  80 mg  11/22/19 22:00  12/02/19 22:40





  Lipitor  PO   80 mg





  [facility]HS OCTAVIANO   Administration


 


Bisacodyl  10 mg  11/21/19 22:07  12/01/19 09:16





  Dulcolax  NH   10 mg





  QDAY PRN   Administration





  Constipation  


 


Clopidogrel Bisulfate  75 mg  11/26/19 10:00  12/03/19 09:35





  Plavix  PO   75 mg





  QDAY OCTAVIANO   Administration


 


Gabapentin  300 mg  11/23/19 23:30  12/03/19 13:03





  Gabapentin  PO   300 mg





  TID OCTAVIANO   Administration


 


Hydralazine HCl  5 mg  11/22/19 02:21  11/23/19 00:12





  Apresoline  IV   5 mg





  Q6H PRN   Administration





  Hypertension  


 


Hydromorphone HCl  0.5 mg  11/25/19 20:12  11/27/19 04:13





  Dilaudid  IV   0.5 mg





  Q3H PRN   Administration





  Pain , Severe (7-10)  


 


Insulin Human Regular  0 units  11/24/19 07:30  12/03/19 13:02





  Humulin R  SUB-Q   8 units





  [facility] OCTAVIANO   Administration





  Protocol  


 


Magnesium Hydroxide  30 ml  11/21/19 22:07  12/01/19 09:16





  Milk Of Magnesia  PO   30 ml





  Q4H PRN   Administration





  Constipation  


 


Naloxone HCl  0.1 mg  11/25/19 20:12 





  Naloxone  IV  





  Q2MIN PRN  





  Res Rate </= 8 or 02 SAT < 92%  


 


Ondansetron HCl  4 mg  11/21/19 22:07  11/22/19 01:25





  Zofran  IV   4 mg





  Q8H PRN   Administration





  Nausea And Vomiting  


 


Oxycodone/Acetaminophen  1 tab  11/25/19 20:12  12/03/19 08:13





  Percocet 5/325  PO   1 tab





  Q6H PRN   Administration





  Pain, Moderate (4-6)  


 


Pantoprazole Sodium  40 mg  11/25/19 10:00  12/03/19 09:35





  Protonix  PO   40 mg





  DAILY OCTAVIANO   Administration


 


Sodium Chloride  10 ml  11/21/19 22:07  11/26/19 05:30





  Sodium Chloride Flush Syringe 10 Ml  IV   10 ml





  PRN PRN   Administration





  LINE FLUSH  














Nutrition/Malnutrition Assess





- Dietary Evaluation


Nutrition/Malnutrition Findings: 


                                        





Nutrition Notes                                            Start:  11/28/19 

13:50


Freq:                                                      Status: Active       




Protocol:                                                                       




 Document     12/02/19 14:55  LM  (Rec: 12/02/19 14:56  LM  [email or web address])


 Nutrition Notes


     Initial or Follow up                        Brief Note


     Subjective/Other Information                Pt eating 100% and his


                                                 appetite has much improved.


 Nutrition Intervention


     Revisit per MD consult or patient           Sign Off


      request:

## 2019-12-04 RX ADMIN — GABAPENTIN SCH MG: 300 CAPSULE ORAL at 10:20

## 2019-12-04 RX ADMIN — INSULIN HUMAN SCH: 100 INJECTION, SOLUTION PARENTERAL at 17:04

## 2019-12-04 RX ADMIN — CLOPIDOGREL BISULFATE SCH MG: 75 TABLET ORAL at 10:19

## 2019-12-04 RX ADMIN — PANTOPRAZOLE SODIUM SCH MG: 40 TABLET, DELAYED RELEASE ORAL at 10:19

## 2019-12-04 RX ADMIN — ASPIRIN SCH MG: 81 TABLET, COATED ORAL at 10:19

## 2019-12-04 RX ADMIN — GABAPENTIN SCH MG: 300 CAPSULE ORAL at 21:41

## 2019-12-04 RX ADMIN — OXYCODONE AND ACETAMINOPHEN PRN TAB: 5; 325 TABLET ORAL at 11:09

## 2019-12-04 RX ADMIN — GABAPENTIN SCH MG: 300 CAPSULE ORAL at 14:14

## 2019-12-04 RX ADMIN — INSULIN HUMAN SCH UNITS: 100 INJECTION, SOLUTION PARENTERAL at 14:14

## 2019-12-04 RX ADMIN — INSULIN HUMAN SCH: 100 INJECTION, SOLUTION PARENTERAL at 22:00

## 2019-12-04 RX ADMIN — INSULIN HUMAN SCH: 100 INJECTION, SOLUTION PARENTERAL at 08:10

## 2019-12-04 NOTE — PROGRESS NOTE
Assessment and Plan


Assessment and plan: 





Acute CVA, With Left Hemiplegia and Dysathria


-status post TPA, stable


-cont asa, statin and plavix


-cont PT/OT 


-neurology consulted





LT high grade Carotid stenosis 


-Vascular surgeon-S/P Left Carotid Endarterectomy and Patch Angioplasty with 

Bovine Pericardium. 


-out-pt f/u in 2 weeks





Chronic systolic HF with EF of 30-35%


-stable





Coronary artery disease


-stable


-Continue asa, statin and plavix





HTN


-controlled





DM2 with neuropathy


-stable on SSI


-Continue gabapentin


-cont Accu check/Ac/hs





COPD


-stable





Coffee ground emesis


-Evaluated by GI, stable, PPI recommended, no plan for scope at this time


-H/H stable





DVT prophylaxis: SCD








Disposition: Rehabilitation placement pending

















History


Interval history: 





Patient has no new complaints.  He denied chest pain or shortness of breath.





Hospitalist Physical





- Constitutional


Vitals: 


                                        











Temp Pulse Resp BP Pulse Ox


 


 97.4 F L  45 L  16   124/56   94 


 


 12/04/19 04:26  12/04/19 04:26  12/04/19 04:26  12/04/19 04:26  12/04/19 10:51











General appearance: Present: no acute distress





- EENT


Eyes: Present: PERRL, EOM intact


ENT: hearing intact, clear oral mucosa





- Neck


Neck: Present: supple





- Respiratory


Respiratory effort: normal


Respiratory: bilateral: CTA





- Cardiovascular


Rhythm: regular


Heart Sounds: Present: S1 & S2





- Extremities


Extremities: No edema





- Abdominal


General gastrointestinal: soft, non-tender, normal bowel sounds





- Integumentary


Integumentary: Present: warm, dry





- Psychiatric


Psychiatric: cooperative





- Neurologic


Neurologic: focal deficits





Results





- Labs


CBC & Chem 7: 


                                 11/24/19 09:05





                                 11/25/19 12:00


Labs: 


                             Laboratory Last Values











WBC  7.6 K/mm3 (4.5-11.0)   11/24/19  09:05    


 


RBC  4.79 M/mm3 (3.65-5.03)   11/24/19  09:05    


 


Hgb  15.3 gm/dl (11.8-15.2)  H  11/24/19  09:05    


 


Hct  44.7 % (35.5-45.6)   11/24/19  09:05    


 


MCV  93 fl (84-94)   11/24/19  09:05    


 


MCH  32 pg (28-32)   11/24/19  09:05    


 


MCHC  34 % (32-34)   11/24/19  09:05    


 


RDW  14.8 % (13.2-15.2)   11/24/19  09:05    


 


Plt Count  179 K/mm3 (140-440)   11/24/19  09:05    


 


Lymph % (Auto)  24.6 % (13.4-35.0)   11/24/19  09:05    


 


Mono % (Auto)  8.7 % (0.0-7.3)  H  11/24/19  09:05    


 


Eos % (Auto)  3.5 % (0.0-4.3)   11/24/19  09:05    


 


Baso % (Auto)  0.3 % (0.0-1.8)   11/24/19  09:05    


 


Lymph #  1.9 K/mm3 (1.2-5.4)   11/24/19  09:05    


 


Mono #  0.7 K/mm3 (0.0-0.8)   11/24/19  09:05    


 


Eos #  0.3 K/mm3 (0.0-0.4)   11/24/19  09:05    


 


Baso #  0.0 K/mm3 (0.0-0.1)   11/24/19  09:05    


 


Seg Neutrophils %  62.9 % (40.0-70.0)   11/24/19  09:05    


 


Seg Neutrophils #  4.7 K/mm3 (1.8-7.7)   11/24/19  09:05    


 


PT  18.3 Sec. (12.2-14.9)  H  11/21/19  19:40    


 


INR  1.54  (0.87-1.13)  H  11/21/19  19:40    


 


APTT  47.9 Sec. (24.2-36.6)  H  11/21/19  19:40    


 


Thrombin Time  43.9 Sec. (15.1-19.6)  H  11/21/19  19:40    


 


Activated Clotting Time  153  ()  H  11/25/19  16:58    


 


Sodium  135 mmol/L (137-145)  L  11/25/19  12:00    


 


Potassium  4.0 mmol/L (3.6-5.0)   11/25/19  12:00    


 


Chloride  98.3 mmol/L ()   11/25/19  12:00    


 


Carbon Dioxide  21 mmol/L (22-30)  L  11/25/19  12:00    


 


Anion Gap  20 mmol/L  11/25/19  12:00    


 


BUN  17 mg/dL (9-20)   11/25/19  12:00    


 


Creatinine  0.7 mg/dL (0.8-1.5)  L  11/25/19  12:00    


 


Estimated GFR  > 60 ml/min  11/25/19  12:00    


 


BUN/Creatinine Ratio  24 %  11/25/19  12:00    


 


Glucose  120 mg/dL ()  H  11/25/19  12:00    


 


POC Glucose  156  ()  H  12/04/19  12:18    


 


Calcium  9.2 mg/dL (8.4-10.2)   11/25/19  12:00    


 


Total Bilirubin  0.40 mg/dL (0.1-1.2)   11/25/19  12:00    


 


Direct Bilirubin  < 0.2 mg/dL (0-0.2)   11/21/19  19:40    


 


AST  14 units/L (5-40)   11/25/19  12:00    


 


ALT  < 5 units/L (7-56)  L  11/25/19  12:00    


 


Alkaline Phosphatase  89 units/L ()   11/25/19  12:00    


 


Troponin T  < 0.010 ng/mL (0.00-0.029)   11/21/19  19:40    


 


Total Protein  7.0 g/dL (6.3-8.2)   11/25/19  12:00    


 


Albumin  3.7 g/dL (3.9-5)  L  11/25/19  12:00    


 


Albumin/Globulin Ratio  1.1 %  11/25/19  12:00    


 


Triglycerides  89 mg/dL (2-149)   11/22/19  03:59    


 


Cholesterol  153 mg/dL ()   11/22/19  03:59    


 


LDL Cholesterol Direct  108 mg/dL ()   11/22/19  03:59    


 


HDL Cholesterol  34 mg/dL (40-59)  L  11/22/19  03:59    


 


Cholesterol/HDL Ratio  4.50 %  11/22/19  03:59    


 


TSH  0.943 mlU/mL (0.270-4.200)   11/21/19  19:40    


 


Blood Type  O POSITIVE   11/25/19  07:58    


 


Antibody Screen  Negative   11/25/19  07:58    














Active Medications





- Current Medications


Current Medications: 














Generic Name Dose Route Start Last Admin





  Trade Name Freq  PRN Reason Stop Dose Admin


 


Acetaminophen  650 mg  11/21/19 22:07  11/24/19 10:45





  Tylenol  PO   650 mg





  Q4H PRN   Administration





  Pain, Mild (1-3)  


 


Aspirin  81 mg  12/02/19 10:00  12/04/19 10:19





  Halfprin Ec  PO   81 mg





  QDAY OCTAVIANO   Administration


 


Atorvastatin Calcium  80 mg  11/22/19 22:00  12/03/19 21:30





  Lipitor  PO   80 mg





  QHS OCTAVIANO   Administration


 


Bisacodyl  10 mg  11/21/19 22:07  12/01/19 09:16





  Dulcolax  HI   10 mg





  QDAY PRN   Administration





  Constipation  


 


Clopidogrel Bisulfate  75 mg  11/26/19 10:00  12/04/19 10:19





  Plavix  PO   75 mg





  QDAY OCTAVIANO   Administration


 


Gabapentin  300 mg  11/23/19 23:30  12/04/19 14:14





  Gabapentin  PO   300 mg





  TID OCTAVIANO   Administration


 


Hydralazine HCl  5 mg  11/22/19 02:21  11/23/19 00:12





  Apresoline  IV   5 mg





  Q6H PRN   Administration





  Hypertension  


 


Hydromorphone HCl  0.5 mg  11/25/19 20:12  11/27/19 04:13





  Dilaudid  IV   0.5 mg





  Q3H PRN   Administration





  Pain , Severe (7-10)  


 


Insulin Human Regular  0 units  11/24/19 07:30  12/04/19 14:14





  Humulin R  SUB-Q   2 units





  ACHS OCTAVIANO   Administration





  Protocol  


 


Magnesium Hydroxide  30 ml  11/21/19 22:07  12/01/19 09:16





  Milk Of Magnesia  PO   30 ml





  Q4H PRN   Administration





  Constipation  


 


Naloxone HCl  0.1 mg  11/25/19 20:12 





  Naloxone  IV  





  Q2MIN PRN  





  Res Rate </= 8 or 02 SAT < 92%  


 


Ondansetron HCl  4 mg  11/21/19 22:07  11/22/19 01:25





  Zofran  IV   4 mg





  Q8H PRN   Administration





  Nausea And Vomiting  


 


Oxycodone/Acetaminophen  1 tab  11/25/19 20:12  12/04/19 11:09





  Percocet 5/325  PO   1 tab





  Q6H PRN   Administration





  Pain, Moderate (4-6)  


 


Pantoprazole Sodium  40 mg  11/25/19 10:00  12/04/19 10:19





  Protonix  PO   40 mg





  DAILY OCTAVIANO   Administration


 


Sodium Chloride  10 ml  11/21/19 22:07  11/26/19 05:30





  Sodium Chloride Flush Syringe 10 Ml  IV   10 ml





  PRN PRN   Administration





  LINE FLUSH  














Nutrition/Malnutrition Assess





- Dietary Evaluation


Nutrition/Malnutrition Findings: 


                                        





Nutrition Notes                                            Start:  11/28/19 

13:50


Freq:                                                      Status: Active       




Protocol:                                                                       




 Document     12/02/19 14:55  LM  (Rec: 12/02/19 14:56  LM  [email or web address])


 Nutrition Notes


     Initial or Follow up                        Brief Note


     Subjective/Other Information                Pt eating 100% and his


                                                 appetite has much improved.


 Nutrition Intervention


     Revisit per MD consult or patient           Sign Off


      request:

## 2019-12-05 RX ADMIN — GABAPENTIN SCH MG: 300 CAPSULE ORAL at 13:44

## 2019-12-05 RX ADMIN — INSULIN HUMAN SCH: 100 INJECTION, SOLUTION PARENTERAL at 08:17

## 2019-12-05 RX ADMIN — GABAPENTIN SCH MG: 300 CAPSULE ORAL at 08:13

## 2019-12-05 RX ADMIN — ASPIRIN SCH MG: 81 TABLET, COATED ORAL at 10:15

## 2019-12-05 RX ADMIN — CLOPIDOGREL BISULFATE SCH MG: 75 TABLET ORAL at 10:15

## 2019-12-05 RX ADMIN — INSULIN HUMAN SCH: 100 INJECTION, SOLUTION PARENTERAL at 12:49

## 2019-12-05 RX ADMIN — PANTOPRAZOLE SODIUM SCH MG: 40 TABLET, DELAYED RELEASE ORAL at 10:15

## 2019-12-05 RX ADMIN — INSULIN HUMAN SCH: 100 INJECTION, SOLUTION PARENTERAL at 16:30

## 2019-12-05 RX ADMIN — GABAPENTIN SCH MG: 300 CAPSULE ORAL at 21:34

## 2019-12-05 NOTE — PROGRESS NOTE
Assessment and Plan


Assessment and plan: 





Acute CVA, With Left Hemiplegia and Dysathria


-status post TPA, stable


-cont asa, statin and plavix


-cont PT/OT 


-neurology consulted





LT high grade Carotid stenosis 


-Vascular surgeon-S/P Left Carotid Endarterectomy and Patch Angioplasty with 

Bovine Pericardium. 


-out-pt f/u in 2 weeks





Recorded bradycardia


-asymptomatic


-TSH level normal


-will monitor him on tele for 24 hrs





Chronic systolic HF with EF of 30-35%


-stable





Coronary artery disease


-stable


-Continue asa, statin and plavix





HTN


-stable





DM2 with neuropathy


-stable on SSI


-Continue gabapentin


-cont Accu check/Ac/hs





COPD


-stable





Coffee ground emesis


-Evaluated by GI, stable, PPI recommended, no plan for scope at this time


-H/H stable





DVT prophylaxis: SCD








Disposition: Rehabilitation placement pending, CM following

















History


Interval history: 





Patient has no new complaints.  He denied chest pain or shortness of breath.





Hospitalist Physical





- Constitutional


Vitals: 


                                        











Temp Pulse Resp BP Pulse Ox


 


 98.3 F   43 L  16   134/51   96 


 


 12/05/19 12:16  12/05/19 12:16  12/05/19 12:16  12/05/19 12:16  12/05/19 12:16











General appearance: Present: no acute distress





- EENT


Eyes: Present: PERRL, EOM intact


ENT: hearing intact, clear oral mucosa





- Neck


Neck: Present: supple





- Respiratory


Respiratory effort: normal


Respiratory: bilateral: CTA





- Cardiovascular


Rhythm: regular


Heart Sounds: Present: S1 & S2





- Extremities


Extremities: No edema





- Abdominal


General gastrointestinal: soft, non-tender, normal bowel sounds





- Integumentary


Integumentary: Present: warm, dry





- Psychiatric


Psychiatric: cooperative





- Neurologic


Neurologic: focal deficits





Results





- Labs


CBC & Chem 7: 


                                 11/24/19 09:05





                                 11/25/19 12:00


Labs: 


                             Laboratory Last Values











WBC  7.6 K/mm3 (4.5-11.0)   11/24/19  09:05    


 


RBC  4.79 M/mm3 (3.65-5.03)   11/24/19  09:05    


 


Hgb  15.3 gm/dl (11.8-15.2)  H  11/24/19  09:05    


 


Hct  44.7 % (35.5-45.6)   11/24/19  09:05    


 


MCV  93 fl (84-94)   11/24/19  09:05    


 


MCH  32 pg (28-32)   11/24/19  09:05    


 


MCHC  34 % (32-34)   11/24/19  09:05    


 


RDW  14.8 % (13.2-15.2)   11/24/19  09:05    


 


Plt Count  179 K/mm3 (140-440)   11/24/19  09:05    


 


Lymph % (Auto)  24.6 % (13.4-35.0)   11/24/19  09:05    


 


Mono % (Auto)  8.7 % (0.0-7.3)  H  11/24/19  09:05    


 


Eos % (Auto)  3.5 % (0.0-4.3)   11/24/19  09:05    


 


Baso % (Auto)  0.3 % (0.0-1.8)   11/24/19  09:05    


 


Lymph #  1.9 K/mm3 (1.2-5.4)   11/24/19  09:05    


 


Mono #  0.7 K/mm3 (0.0-0.8)   11/24/19  09:05    


 


Eos #  0.3 K/mm3 (0.0-0.4)   11/24/19  09:05    


 


Baso #  0.0 K/mm3 (0.0-0.1)   11/24/19  09:05    


 


Seg Neutrophils %  62.9 % (40.0-70.0)   11/24/19  09:05    


 


Seg Neutrophils #  4.7 K/mm3 (1.8-7.7)   11/24/19  09:05    


 


PT  18.3 Sec. (12.2-14.9)  H  11/21/19  19:40    


 


INR  1.54  (0.87-1.13)  H  11/21/19  19:40    


 


APTT  47.9 Sec. (24.2-36.6)  H  11/21/19  19:40    


 


Thrombin Time  43.9 Sec. (15.1-19.6)  H  11/21/19  19:40    


 


Activated Clotting Time  153  ()  H  11/25/19  16:58    


 


Sodium  135 mmol/L (137-145)  L  11/25/19  12:00    


 


Potassium  4.0 mmol/L (3.6-5.0)   11/25/19  12:00    


 


Chloride  98.3 mmol/L ()   11/25/19  12:00    


 


Carbon Dioxide  21 mmol/L (22-30)  L  11/25/19  12:00    


 


Anion Gap  20 mmol/L  11/25/19  12:00    


 


BUN  17 mg/dL (9-20)   11/25/19  12:00    


 


Creatinine  0.7 mg/dL (0.8-1.5)  L  11/25/19  12:00    


 


Estimated GFR  > 60 ml/min  11/25/19  12:00    


 


BUN/Creatinine Ratio  24 %  11/25/19  12:00    


 


Glucose  120 mg/dL ()  H  11/25/19  12:00    


 


POC Glucose  113  ()  H  12/05/19  08:26    


 


Calcium  9.2 mg/dL (8.4-10.2)   11/25/19  12:00    


 


Total Bilirubin  0.40 mg/dL (0.1-1.2)   11/25/19  12:00    


 


Direct Bilirubin  < 0.2 mg/dL (0-0.2)   11/21/19  19:40    


 


AST  14 units/L (5-40)   11/25/19  12:00    


 


ALT  < 5 units/L (7-56)  L  11/25/19  12:00    


 


Alkaline Phosphatase  89 units/L ()   11/25/19  12:00    


 


Troponin T  < 0.010 ng/mL (0.00-0.029)   11/21/19  19:40    


 


Total Protein  7.0 g/dL (6.3-8.2)   11/25/19  12:00    


 


Albumin  3.7 g/dL (3.9-5)  L  11/25/19  12:00    


 


Albumin/Globulin Ratio  1.1 %  11/25/19  12:00    


 


Triglycerides  89 mg/dL (2-149)   11/22/19  03:59    


 


Cholesterol  153 mg/dL ()   11/22/19  03:59    


 


LDL Cholesterol Direct  108 mg/dL ()   11/22/19  03:59    


 


HDL Cholesterol  34 mg/dL (40-59)  L  11/22/19  03:59    


 


Cholesterol/HDL Ratio  4.50 %  11/22/19  03:59    


 


TSH  0.943 mlU/mL (0.270-4.200)   11/21/19  19:40    


 


Blood Type  O POSITIVE   11/25/19  07:58    


 


Antibody Screen  Negative   11/25/19  07:58    














Active Medications





- Current Medications


Current Medications: 














Generic Name Dose Route Start Last Admin





  Trade Name Freq  PRN Reason Stop Dose Admin


 


Acetaminophen  650 mg  11/21/19 22:07  11/24/19 10:45





  Tylenol  PO   650 mg





  Q4H PRN   Administration





  Pain, Mild (1-3)  


 


Aspirin  81 mg  12/02/19 10:00  12/05/19 10:15





  Halfprin Ec  PO   81 mg





  QDAY OCTAVIANO   Administration


 


Atorvastatin Calcium  80 mg  11/22/19 22:00  12/04/19 21:42





  Lipitor  PO   80 mg





  QHS OCTAVIANO   Administration


 


Bisacodyl  10 mg  11/21/19 22:07  12/01/19 09:16





  Dulcolax  CT   10 mg





  QDAY PRN   Administration





  Constipation  


 


Clopidogrel Bisulfate  75 mg  11/26/19 10:00  12/05/19 10:15





  Plavix  PO   75 mg





  QDAY OCTAVIANO   Administration


 


Gabapentin  300 mg  11/23/19 23:30  12/05/19 08:13





  Gabapentin  PO   300 mg





  TID OCTAVIANO   Administration


 


Hydralazine HCl  5 mg  11/22/19 02:21  11/23/19 00:12





  Apresoline  IV   5 mg





  Q6H PRN   Administration





  Hypertension  


 


Hydromorphone HCl  0.5 mg  11/25/19 20:12  11/27/19 04:13





  Dilaudid  IV   0.5 mg





  Q3H PRN   Administration





  Pain , Severe (7-10)  


 


Insulin Human Regular  0 units  11/24/19 07:30  12/05/19 08:17





  Humulin R  SUB-Q   Not Given





  [facility]  





  Protocol  


 


Magnesium Hydroxide  30 ml  11/21/19 22:07  12/01/19 09:16





  Milk Of Magnesia  PO   30 ml





  Q4H PRN   Administration





  Constipation  


 


Naloxone HCl  0.1 mg  11/25/19 20:12 





  Naloxone  IV  





  Q2MIN PRN  





  Res Rate </= 8 or 02 SAT < 92%  


 


Ondansetron HCl  4 mg  11/21/19 22:07  11/22/19 01:25





  Zofran  IV   4 mg





  Q8H PRN   Administration





  Nausea And Vomiting  


 


Oxycodone/Acetaminophen  1 tab  11/25/19 20:12  12/04/19 11:09





  Percocet 5/325  PO   1 tab





  Q6H PRN   Administration





  Pain, Moderate (4-6)  


 


Pantoprazole Sodium  40 mg  11/25/19 10:00  12/05/19 10:15





  Protonix  PO   40 mg





  DAILY OCTAVIANO   Administration


 


Sodium Chloride  10 ml  11/21/19 22:07  11/26/19 05:30





  Sodium Chloride Flush Syringe 10 Ml  IV   10 ml





  PRN PRN   Administration





  LINE FLUSH  














Nutrition/Malnutrition Assess





- Dietary Evaluation


Nutrition/Malnutrition Findings: 


                                        





Nutrition Notes                                            Start:  11/28/19 

13:50


Freq:                                                      Status: Active       




Protocol:                                                                       




 Document     12/02/19 14:55  LM  (Rec: 12/02/19 14:56  LM  [email or web address])


 Nutrition Notes


     Initial or Follow up                        Brief Note


     Subjective/Other Information                Pt eating 100% and his


                                                 appetite has much improved.


 Nutrition Intervention


     Revisit per MD consult or patient           Sign Off


      request:

## 2019-12-06 VITALS — SYSTOLIC BLOOD PRESSURE: 149 MMHG | DIASTOLIC BLOOD PRESSURE: 59 MMHG

## 2019-12-06 RX ADMIN — INSULIN HUMAN SCH UNITS: 100 INJECTION, SOLUTION PARENTERAL at 00:14

## 2019-12-06 RX ADMIN — GABAPENTIN SCH MG: 300 CAPSULE ORAL at 08:43

## 2019-12-06 RX ADMIN — INSULIN HUMAN SCH: 100 INJECTION, SOLUTION PARENTERAL at 07:51

## 2019-12-06 RX ADMIN — ASPIRIN SCH MG: 81 TABLET, COATED ORAL at 10:03

## 2019-12-06 RX ADMIN — INSULIN HUMAN SCH: 100 INJECTION, SOLUTION PARENTERAL at 12:52

## 2019-12-06 RX ADMIN — GABAPENTIN SCH MG: 300 CAPSULE ORAL at 13:10

## 2019-12-06 RX ADMIN — OXYCODONE AND ACETAMINOPHEN PRN TAB: 5; 325 TABLET ORAL at 00:23

## 2019-12-06 RX ADMIN — INSULIN HUMAN SCH: 100 INJECTION, SOLUTION PARENTERAL at 17:15

## 2019-12-06 RX ADMIN — OXYCODONE AND ACETAMINOPHEN PRN TAB: 5; 325 TABLET ORAL at 13:12

## 2019-12-06 RX ADMIN — PANTOPRAZOLE SODIUM SCH MG: 40 TABLET, DELAYED RELEASE ORAL at 10:03

## 2019-12-06 RX ADMIN — CLOPIDOGREL BISULFATE SCH MG: 75 TABLET ORAL at 10:04

## 2019-12-06 NOTE — PROGRESS NOTE
Assessment and Plan


Assessment and plan: 





Acute CVA, With Left Hemiplegia and Dysathria


-status post TPA, stable


-cont asa, statin and plavix


-cont PT/OT 


-neurology consulted





LT high grade Carotid stenosis 


-Vascular surgeon-S/P Left Carotid Endarterectomy and Patch Angioplasty with 

Bovine Pericardium. 


-out-pt f/u in 2 weeks





Recorded bradycardia


-no bradycardia on tele


-TSH level normal





Chronic systolic HF with EF of 30-35%


-stable





Coronary artery disease


-stable


-Continue asa, statin and plavix





HTN


-stable





DM2 with neuropathy


-stable on SSI


-Continue gabapentin


-cont Accu check/Ac/hs





COPD


-stable





Coffee ground emesis


-Evaluated by GI, stable, PPI recommended, no plan for scope at this time


-H/H stable





DVT prophylaxis: SCD








Disposition: Awaiting insurance approval for Rehabilitation facility placement, 

CM following

















History


Interval history: 





Patient has no new complaints.  He denied chest pain or shortness of breath.





Hospitalist Physical





- Constitutional


Vitals: 


                                        











Temp Pulse Resp BP Pulse Ox


 


 97.4 F L  61   18   110/44   95 


 


 12/06/19 06:08  12/06/19 06:08  12/06/19 06:08  12/06/19 06:08  12/06/19 06:08











General appearance: Present: no acute distress





- EENT


Eyes: Present: PERRL, EOM intact


ENT: hearing intact, clear oral mucosa





- Neck


Neck: Present: supple





- Respiratory


Respiratory effort: normal


Respiratory: bilateral: CTA





- Cardiovascular


Rhythm: regular


Heart Sounds: Present: S1 & S2





- Extremities


Extremities: No edema





- Abdominal


General gastrointestinal: soft, non-tender, normal bowel sounds





- Integumentary


Integumentary: Present: warm, dry





- Psychiatric


Psychiatric: cooperative





- Neurologic


Neurologic: moves all extremities





Results





- Labs


CBC & Chem 7: 


                                 11/24/19 09:05





                                 11/25/19 12:00


Labs: 


                             Laboratory Last Values











WBC  7.6 K/mm3 (4.5-11.0)   11/24/19  09:05    


 


RBC  4.79 M/mm3 (3.65-5.03)   11/24/19  09:05    


 


Hgb  15.3 gm/dl (11.8-15.2)  H  11/24/19  09:05    


 


Hct  44.7 % (35.5-45.6)   11/24/19  09:05    


 


MCV  93 fl (84-94)   11/24/19  09:05    


 


MCH  32 pg (28-32)   11/24/19  09:05    


 


MCHC  34 % (32-34)   11/24/19  09:05    


 


RDW  14.8 % (13.2-15.2)   11/24/19  09:05    


 


Plt Count  179 K/mm3 (140-440)   11/24/19  09:05    


 


Lymph % (Auto)  24.6 % (13.4-35.0)   11/24/19  09:05    


 


Mono % (Auto)  8.7 % (0.0-7.3)  H  11/24/19  09:05    


 


Eos % (Auto)  3.5 % (0.0-4.3)   11/24/19  09:05    


 


Baso % (Auto)  0.3 % (0.0-1.8)   11/24/19  09:05    


 


Lymph #  1.9 K/mm3 (1.2-5.4)   11/24/19  09:05    


 


Mono #  0.7 K/mm3 (0.0-0.8)   11/24/19  09:05    


 


Eos #  0.3 K/mm3 (0.0-0.4)   11/24/19  09:05    


 


Baso #  0.0 K/mm3 (0.0-0.1)   11/24/19  09:05    


 


Seg Neutrophils %  62.9 % (40.0-70.0)   11/24/19  09:05    


 


Seg Neutrophils #  4.7 K/mm3 (1.8-7.7)   11/24/19  09:05    


 


PT  18.3 Sec. (12.2-14.9)  H  11/21/19  19:40    


 


INR  1.54  (0.87-1.13)  H  11/21/19  19:40    


 


APTT  47.9 Sec. (24.2-36.6)  H  11/21/19  19:40    


 


Thrombin Time  43.9 Sec. (15.1-19.6)  H  11/21/19  19:40    


 


Activated Clotting Time  153  ()  H  11/25/19  16:58    


 


Sodium  135 mmol/L (137-145)  L  11/25/19  12:00    


 


Potassium  4.0 mmol/L (3.6-5.0)   11/25/19  12:00    


 


Chloride  98.3 mmol/L ()   11/25/19  12:00    


 


Carbon Dioxide  21 mmol/L (22-30)  L  11/25/19  12:00    


 


Anion Gap  20 mmol/L  11/25/19  12:00    


 


BUN  17 mg/dL (9-20)   11/25/19  12:00    


 


Creatinine  0.7 mg/dL (0.8-1.5)  L  11/25/19  12:00    


 


Estimated GFR  > 60 ml/min  11/25/19  12:00    


 


BUN/Creatinine Ratio  24 %  11/25/19  12:00    


 


Glucose  120 mg/dL ()  H  11/25/19  12:00    


 


POC Glucose  136  ()  H  12/06/19  07:57    


 


Calcium  9.2 mg/dL (8.4-10.2)   11/25/19  12:00    


 


Total Bilirubin  0.40 mg/dL (0.1-1.2)   11/25/19  12:00    


 


Direct Bilirubin  < 0.2 mg/dL (0-0.2)   11/21/19  19:40    


 


AST  14 units/L (5-40)   11/25/19  12:00    


 


ALT  < 5 units/L (7-56)  L  11/25/19  12:00    


 


Alkaline Phosphatase  89 units/L ()   11/25/19  12:00    


 


Troponin T  < 0.010 ng/mL (0.00-0.029)   11/21/19  19:40    


 


Total Protein  7.0 g/dL (6.3-8.2)   11/25/19  12:00    


 


Albumin  3.7 g/dL (3.9-5)  L  11/25/19  12:00    


 


Albumin/Globulin Ratio  1.1 %  11/25/19  12:00    


 


Triglycerides  89 mg/dL (2-149)   11/22/19  03:59    


 


Cholesterol  153 mg/dL ()   11/22/19  03:59    


 


LDL Cholesterol Direct  108 mg/dL ()   11/22/19  03:59    


 


HDL Cholesterol  34 mg/dL (40-59)  L  11/22/19  03:59    


 


Cholesterol/HDL Ratio  4.50 %  11/22/19  03:59    


 


TSH  0.943 mlU/mL (0.270-4.200)   11/21/19  19:40    


 


Blood Type  O POSITIVE   11/25/19  07:58    


 


Antibody Screen  Negative   11/25/19  07:58    














Active Medications





- Current Medications


Current Medications: 














Generic Name Dose Route Start Last Admin





  Trade Name Jaylenq  PRN Reason Stop Dose Admin


 


Acetaminophen  650 mg  11/21/19 22:07  11/24/19 10:45





  Tylenol  PO   650 mg





  Q4H PRN   Administration





  Pain, Mild (1-3)  


 


Aspirin  81 mg  12/02/19 10:00  12/06/19 10:03





  Halfprin Ec  PO   81 mg





  QDAY OCTAVIANO   Administration


 


Atorvastatin Calcium  80 mg  11/22/19 22:00  12/05/19 21:34





  Lipitor  PO   80 mg





  QHS OCTAVIANO   Administration


 


Bisacodyl  10 mg  11/21/19 22:07  12/01/19 09:16





  Dulcolax  MT   10 mg





  QDAY PRN   Administration





  Constipation  


 


Clopidogrel Bisulfate  75 mg  11/26/19 10:00  12/06/19 10:04





  Plavix  PO   75 mg





  QDAY OCTAVIANO   Administration


 


Gabapentin  300 mg  11/23/19 23:30  12/06/19 08:43





  Gabapentin  PO   300 mg





  TID OCTAVIANO   Administration


 


Hydralazine HCl  5 mg  11/22/19 02:21  11/23/19 00:12





  Apresoline  IV   5 mg





  Q6H PRN   Administration





  Hypertension  


 


Hydromorphone HCl  0.5 mg  11/25/19 20:12  11/27/19 04:13





  Dilaudid  IV   0.5 mg





  Q3H PRN   Administration





  Pain , Severe (7-10)  


 


Insulin Human Regular  0 units  11/24/19 07:30  12/06/19 07:51





  Humulin R  SUB-Q   Not Given





  [facility]  





  Protocol  


 


Magnesium Hydroxide  30 ml  11/21/19 22:07  12/01/19 09:16





  Milk Of Magnesia  PO   30 ml





  Q4H PRN   Administration





  Constipation  


 


Naloxone HCl  0.1 mg  11/25/19 20:12 





  Naloxone  IV  





  Q2MIN PRN  





  Res Rate </= 8 or 02 SAT < 92%  


 


Ondansetron HCl  4 mg  11/21/19 22:07  11/22/19 01:25





  Zofran  IV   4 mg





  Q8H PRN   Administration





  Nausea And Vomiting  


 


Oxycodone/Acetaminophen  1 tab  11/25/19 20:12  12/06/19 00:23





  Percocet 5/325  PO   1 tab





  Q6H PRN   Administration





  Pain, Moderate (4-6)  


 


Pantoprazole Sodium  40 mg  11/25/19 10:00  12/06/19 10:03





  Protonix  PO   40 mg





  DAILY OCTAVIANO   Administration


 


Sodium Chloride  10 ml  11/21/19 22:07  11/26/19 05:30





  Sodium Chloride Flush Syringe 10 Ml  IV   10 ml





  PRN PRN   Administration





  LINE FLUSH  














Nutrition/Malnutrition Assess





- Dietary Evaluation


Nutrition/Malnutrition Findings: 


                                        





Nutrition Notes                                            Start:  11/28/19 

13:50


Freq:                                                      Status: Active       




Protocol:                                                                       




 Document     12/02/19 14:55  LM  (Rec: 12/02/19 14:56  LM  [email or web address])


 Nutrition Notes


     Initial or Follow up                        Brief Note


     Subjective/Other Information                Pt eating 100% and his


                                                 appetite has much improved.


 Nutrition Intervention


     Revisit per MD consult or patient           Sign Off


      request:

## 2019-12-06 NOTE — DISCHARGE SUMMARY
Providers





- Providers


Date of Admission: 


11/21/19 22:06





Date of discharge: 12/06/19


Attending physician: 


DONNIE LANGSTON





                                        





11/21/19


Consult to Physician [CONS] Routine 


   Comment: 


   Consulting Provider: GURPREET ESPAÑA


   Physician Instructions: 


   Reason For Exam: cva





11/21/19 22:07


Consult to Case Management [CONS] Routine 


   Services Needed at Discharge: Home Health Services


   Notified:: 


Occupational Therapy Evaluate and Treat [CONS] Routine 


   Comment: 


   Reason For Exam: Neuro deficits


Physical Therapy Evaluation and Treat [CONS] Routine 


   Comment: 


   Reason For Exam: Neuro deficits





11/22/19 02:03


Consult to Physician [CONS] Routine 


   Comment: 


   Consulting Provider: RAEANN GONG


   Physician Instructions: 


   Reason For Exam: ugib





11/22/19 02:21


Consult to Physician [CONS] Routine 


   Comment: 


   Consulting Provider: JOSEY HART


   Physician Instructions: 


   Reason For Exam: carotid stenosis





11/22/19 18:12


Speech Therapy Evaluation and Treat [CONS] Routine 


   Reason For Exam: cva





11/24/19 14:29


Consult to Case Management [CONS] Routine 


   Services Needed at Discharge: Home Health Services


   Notified:: 


Physical Therapy Evaluation and Treat [CONS] Routine 


   Comment: does pateint need SNIF


   Reason For Exam: cva





11/26/19 08:23


Consult to Physician [CONS] Routine 


   Comment: 


   Consulting Provider: ASAD TOVAR


   Physician Instructions: 


   Reason For Exam: CVA





11/29/19 11:15


Physical Therapy Evaluation and Treat [CONS] Routine 


   Comment: 


   Reason For Exam: s/p left carotid endartectomy











Primary care physician: 


PRIMARY CARE MD








Hospitalization


Reason for admission: Acute CVA, with Left Hemiplegia and Dysarthria


Condition: Stable


Pertinent studies: 





Brain MRI:


Acute ischemic changes in the LT frontal and parietal cortex





Neck CTA:


High grade narrowing at the origin of LT ICA


Procedures: 





S/P Left Carotid Endarterectomy and Patch Angioplasty with Bovine Pericardium. 


Hospital course: 





Final discharge diagnosis:


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


Symptomatic severe LT sided carotid stenosis 


Coffee ground emesis


Chronic systolic HF with EF of 30-35%


Coronary artery disease


HTN


DM2 with neuropathy


COPD





Hospital course:





In the ED, pt received TPA and was later admitted to the ICU for close mon

itoring. At some point, he developed coffee ground emesis for which GI was 

consulted. However, no further invasive investigative testing was recommended 

except to tx with PPI. Thereafter, he underwent surgical intervention by the 

vascular surgery for his severe LT sided carotid stenosis without any 

complications. Subsequently, he was transferred out of the ICU to the floor for 

continued mgx. He was evaluated by PT and inpt rehab was recommended, which is 

where he was discharged to.





Disposition: [id number]


Time spent for discharge: 40 mins





Core Measure Documentation





- Palliative Care


Palliative Care/ Comfort Measures: Not Applicable





- Core Measures


Any of the following diagnoses?: stroke





- Stroke Discharge Requirements


Statin for LDL = or >70 mg/dl on DC: Yes


Anticoag for atrial fib/atrial flutter: Not Applicable


Antithrombotic for ischemic stroke: Yes





Exam





- Constitutional


Vitals: 


                                        











Temp Pulse Resp BP Pulse Ox


 


 97.3 F L  66   19   149/59   99 


 


 12/06/19 12:18  12/06/19 12:18  12/06/19 12:18  12/06/19 12:18  12/06/19 12:18











General appearance: Present: no acute distress, well-nourished





- EENT


Eyes: Present: PERRL, EOM intact


ENT: hearing intact, clear oral mucosa





- Neck


Neck: Present: supple, normal ROM





- Respiratory


Respiratory effort: normal


Respiratory: bilateral: CTA





- Cardiovascular


Rhythm: regular


Heart Sounds: Present: S1 & S2.  Absent: rub, click





- Extremities


Extremities: No edema





- Abdominal


General gastrointestinal: Present: soft, non-tender, non-distended, normal bowel

 sounds





- Integumentary


Integumentary: Present: clear, warm, dry





- Musculoskeletal


Musculoskeletal: left sided weakness





- Psychiatric


Psychiatric: appropriate mood/affect





- Neurologic


Neurologic: focal deficits (LT sided weakness)





Plan


Follow up with: 


PRIMARY CARE,MD [Primary Care Provider] - 7 Days


DOROTEO LO MD [Staff Physician] - 7 Days


RIP LACKEY MD [Staff Physician] - 7 Days


MAGNO HAMMER MD [Staff Physician] - 7 Days


Prescriptions: 


AtorvaSTATin [Lipitor] 80 mg PO QHS #30 tablet


Clopidogrel [Plavix] 75 mg PO QDAY #30 tablet